# Patient Record
Sex: MALE | Race: BLACK OR AFRICAN AMERICAN | Employment: OTHER | URBAN - METROPOLITAN AREA
[De-identification: names, ages, dates, MRNs, and addresses within clinical notes are randomized per-mention and may not be internally consistent; named-entity substitution may affect disease eponyms.]

---

## 2017-01-07 DIAGNOSIS — I10 ESSENTIAL HYPERTENSION: ICD-10-CM

## 2017-01-10 RX ORDER — AMLODIPINE BESYLATE 5 MG/1
TABLET ORAL
Qty: 30 TABLET | Refills: 3 | Status: SHIPPED | OUTPATIENT
Start: 2017-01-10 | End: 2017-04-04 | Stop reason: SDUPTHER

## 2017-01-20 RX ORDER — GABAPENTIN 300 MG/1
CAPSULE ORAL
Qty: 270 CAPSULE | Refills: 0 | Status: SHIPPED | OUTPATIENT
Start: 2017-01-20 | End: 2017-10-10 | Stop reason: SDUPTHER

## 2017-02-02 RX ORDER — MELOXICAM 15 MG/1
TABLET ORAL
Qty: 30 TABLET | Refills: 0 | Status: SHIPPED | OUTPATIENT
Start: 2017-02-02 | End: 2017-04-04 | Stop reason: SDUPTHER

## 2017-03-13 RX ORDER — HYDROCHLOROTHIAZIDE 12.5 MG/1
12.5 CAPSULE, GELATIN COATED ORAL EVERY MORNING
Qty: 30 CAPSULE | Refills: 5 | Status: SHIPPED | OUTPATIENT
Start: 2017-03-13 | End: 2017-04-04

## 2017-03-13 RX ORDER — VALSARTAN AND HYDROCHLOROTHIAZIDE 320; 12.5 MG/1; MG/1
1 TABLET, FILM COATED ORAL DAILY
Qty: 30 TABLET | Refills: 5 | Status: SHIPPED | OUTPATIENT
Start: 2017-03-13 | End: 2017-05-08 | Stop reason: SDUPTHER

## 2017-03-15 ENCOUNTER — TELEPHONE (OUTPATIENT)
Dept: INTERNAL MEDICINE CLINIC | Age: 67
End: 2017-03-15

## 2017-03-21 ENCOUNTER — TELEPHONE (OUTPATIENT)
Dept: INTERNAL MEDICINE CLINIC | Age: 67
End: 2017-03-21

## 2017-04-03 RX ORDER — MELOXICAM 15 MG/1
TABLET ORAL
Qty: 30 TABLET | Refills: 0 | Status: SHIPPED | OUTPATIENT
Start: 2017-04-03 | End: 2017-04-04 | Stop reason: SDUPTHER

## 2017-04-04 ENCOUNTER — OFFICE VISIT (OUTPATIENT)
Dept: INTERNAL MEDICINE CLINIC | Age: 67
End: 2017-04-04

## 2017-04-04 VITALS
BODY MASS INDEX: 18.78 KG/M2 | WEIGHT: 110 LBS | HEART RATE: 86 BPM | HEIGHT: 64 IN | SYSTOLIC BLOOD PRESSURE: 110 MMHG | TEMPERATURE: 97.8 F | DIASTOLIC BLOOD PRESSURE: 60 MMHG | OXYGEN SATURATION: 96 %

## 2017-04-04 DIAGNOSIS — Z85.46 H/O PROSTATE CANCER: ICD-10-CM

## 2017-04-04 DIAGNOSIS — D53.9 ANEMIA, MACROCYTIC: Primary | ICD-10-CM

## 2017-04-04 DIAGNOSIS — E05.90 HYPERTHYROIDISM: ICD-10-CM

## 2017-04-04 DIAGNOSIS — Z12.11 ENCOUNTER FOR SCREENING COLONOSCOPY: ICD-10-CM

## 2017-04-04 DIAGNOSIS — I10 ESSENTIAL HYPERTENSION: ICD-10-CM

## 2017-04-04 DIAGNOSIS — E05.00 GRAVES DISEASE: ICD-10-CM

## 2017-04-04 PROCEDURE — 3288F FALL RISK ASSESSMENT DOCD: CPT | Performed by: INTERNAL MEDICINE

## 2017-04-04 PROCEDURE — G8510 SCR DEP NEG, NO PLAN REQD: HCPCS | Performed by: INTERNAL MEDICINE

## 2017-04-04 PROCEDURE — 99214 OFFICE O/P EST MOD 30 MIN: CPT | Performed by: INTERNAL MEDICINE

## 2017-04-04 RX ORDER — SILDENAFIL 100 MG/1
100 TABLET, FILM COATED ORAL PRN
Qty: 6 TABLET | Refills: 3 | Status: SHIPPED | OUTPATIENT
Start: 2017-04-04 | End: 2020-06-01 | Stop reason: SDUPTHER

## 2017-04-04 RX ORDER — MELOXICAM 15 MG/1
15 TABLET ORAL DAILY
Qty: 30 TABLET | Refills: 5 | Status: SHIPPED | OUTPATIENT
Start: 2017-04-04 | End: 2019-03-26

## 2017-04-04 ASSESSMENT — ENCOUNTER SYMPTOMS
GASTROINTESTINAL NEGATIVE: 1
RESPIRATORY NEGATIVE: 1
EYES NEGATIVE: 1

## 2017-04-04 ASSESSMENT — PATIENT HEALTH QUESTIONNAIRE - PHQ9
SUM OF ALL RESPONSES TO PHQ QUESTIONS 1-9: 0
SUM OF ALL RESPONSES TO PHQ9 QUESTIONS 1 & 2: 0
2. FEELING DOWN, DEPRESSED OR HOPELESS: 0
1. LITTLE INTEREST OR PLEASURE IN DOING THINGS: 0

## 2017-04-05 LAB
A/G RATIO: 1.9 (ref 1.1–2.2)
ALBUMIN SERPL-MCNC: 4.5 G/DL (ref 3.4–5)
ALP BLD-CCNC: 59 U/L (ref 40–129)
ALT SERPL-CCNC: 25 U/L (ref 10–40)
ANION GAP SERPL CALCULATED.3IONS-SCNC: 20 MMOL/L (ref 3–16)
AST SERPL-CCNC: 28 U/L (ref 15–37)
BILIRUB SERPL-MCNC: 1 MG/DL (ref 0–1)
BUN BLDV-MCNC: 44 MG/DL (ref 7–20)
CALCIUM SERPL-MCNC: 9.5 MG/DL (ref 8.3–10.6)
CHLORIDE BLD-SCNC: 96 MMOL/L (ref 99–110)
CHOLESTEROL, TOTAL: 222 MG/DL (ref 0–199)
CO2: 22 MMOL/L (ref 21–32)
CREAT SERPL-MCNC: 2.7 MG/DL (ref 0.8–1.3)
FOLATE: >20 NG/ML (ref 4.78–24.2)
GFR AFRICAN AMERICAN: 29
GFR NON-AFRICAN AMERICAN: 24
GLOBULIN: 2.4 G/DL
GLUCOSE BLD-MCNC: 90 MG/DL (ref 70–99)
HCT VFR BLD CALC: 38.3 % (ref 40.5–52.5)
HDLC SERPL-MCNC: 79 MG/DL (ref 40–60)
HEMATOLOGY PATH CONSULT: NORMAL
HEMATOLOGY PATH CONSULT: YES
HEMOGLOBIN: 12.5 G/DL (ref 13.5–17.5)
HEPATITIS C ANTIBODY INTERPRETATION: NORMAL
LDL CHOLESTEROL CALCULATED: 118 MG/DL
MCH RBC QN AUTO: 37.3 PG (ref 26–34)
MCHC RBC AUTO-ENTMCNC: 32.6 G/DL (ref 31–36)
MCV RBC AUTO: 114.4 FL (ref 80–100)
PDW BLD-RTO: 15.3 % (ref 12.4–15.4)
PLATELET # BLD: 197 K/UL (ref 135–450)
PMV BLD AUTO: 7.5 FL (ref 5–10.5)
POTASSIUM SERPL-SCNC: 4.2 MMOL/L (ref 3.5–5.1)
RBC # BLD: 3.35 M/UL (ref 4.2–5.9)
SLIDE REVIEW: ABNORMAL
SODIUM BLD-SCNC: 138 MMOL/L (ref 136–145)
TOTAL PROTEIN: 6.9 G/DL (ref 6.4–8.2)
TRIGL SERPL-MCNC: 123 MG/DL (ref 0–150)
TSH REFLEX FT4: 2.22 UIU/ML (ref 0.27–4.2)
VITAMIN B-12: >2000 PG/ML (ref 211–911)
VLDLC SERPL CALC-MCNC: 25 MG/DL
WBC # BLD: 5.5 K/UL (ref 4–11)

## 2017-05-08 ENCOUNTER — OFFICE VISIT (OUTPATIENT)
Dept: INTERNAL MEDICINE CLINIC | Age: 67
End: 2017-05-08

## 2017-05-08 VITALS
OXYGEN SATURATION: 95 % | SYSTOLIC BLOOD PRESSURE: 122 MMHG | HEIGHT: 64 IN | BODY MASS INDEX: 18.23 KG/M2 | WEIGHT: 106.8 LBS | DIASTOLIC BLOOD PRESSURE: 84 MMHG | HEART RATE: 84 BPM | TEMPERATURE: 97.8 F

## 2017-05-08 DIAGNOSIS — Z01.818 PRE-OP EXAMINATION: Primary | ICD-10-CM

## 2017-05-08 DIAGNOSIS — D53.9 ANEMIA, MACROCYTIC: ICD-10-CM

## 2017-05-08 DIAGNOSIS — E05.90 HYPERTHYROIDISM: ICD-10-CM

## 2017-05-08 DIAGNOSIS — I10 ESSENTIAL HYPERTENSION: ICD-10-CM

## 2017-05-08 LAB
ALBUMIN SERPL-MCNC: 4.9 G/DL (ref 3.4–5)
ANION GAP SERPL CALCULATED.3IONS-SCNC: 19 MMOL/L (ref 3–16)
BUN BLDV-MCNC: 28 MG/DL (ref 7–20)
CALCIUM SERPL-MCNC: 10.1 MG/DL (ref 8.3–10.6)
CHLORIDE BLD-SCNC: 103 MMOL/L (ref 99–110)
CO2: 23 MMOL/L (ref 21–32)
CREAT SERPL-MCNC: 1 MG/DL (ref 0.8–1.3)
GFR AFRICAN AMERICAN: >60
GFR NON-AFRICAN AMERICAN: >60
GLUCOSE BLD-MCNC: 115 MG/DL (ref 70–99)
PHOSPHORUS: 2.4 MG/DL (ref 2.5–4.9)
POTASSIUM SERPL-SCNC: 4.5 MMOL/L (ref 3.5–5.1)
SODIUM BLD-SCNC: 145 MMOL/L (ref 136–145)

## 2017-05-08 PROCEDURE — 3288F FALL RISK ASSESSMENT DOCD: CPT | Performed by: INTERNAL MEDICINE

## 2017-05-08 PROCEDURE — G0009 ADMIN PNEUMOCOCCAL VACCINE: HCPCS | Performed by: INTERNAL MEDICINE

## 2017-05-08 PROCEDURE — 93000 ELECTROCARDIOGRAM COMPLETE: CPT | Performed by: INTERNAL MEDICINE

## 2017-05-08 PROCEDURE — 99215 OFFICE O/P EST HI 40 MIN: CPT | Performed by: INTERNAL MEDICINE

## 2017-05-08 PROCEDURE — G8510 SCR DEP NEG, NO PLAN REQD: HCPCS | Performed by: INTERNAL MEDICINE

## 2017-05-08 PROCEDURE — 90670 PCV13 VACCINE IM: CPT | Performed by: INTERNAL MEDICINE

## 2017-05-08 RX ORDER — VALSARTAN AND HYDROCHLOROTHIAZIDE 320; 12.5 MG/1; MG/1
1 TABLET, FILM COATED ORAL DAILY
Qty: 30 TABLET | Refills: 5 | Status: SHIPPED | OUTPATIENT
Start: 2017-05-08 | End: 2017-07-05 | Stop reason: SDUPTHER

## 2017-05-08 ASSESSMENT — PATIENT HEALTH QUESTIONNAIRE - PHQ9
SUM OF ALL RESPONSES TO PHQ9 QUESTIONS 1 & 2: 0
2. FEELING DOWN, DEPRESSED OR HOPELESS: 0
SUM OF ALL RESPONSES TO PHQ QUESTIONS 1-9: 0
1. LITTLE INTEREST OR PLEASURE IN DOING THINGS: 0

## 2017-05-08 ASSESSMENT — ENCOUNTER SYMPTOMS
RESPIRATORY NEGATIVE: 1
GASTROINTESTINAL NEGATIVE: 1
EYES NEGATIVE: 1

## 2017-06-07 RX ORDER — MELOXICAM 15 MG/1
TABLET ORAL
Qty: 30 TABLET | Refills: 0 | Status: SHIPPED | OUTPATIENT
Start: 2017-06-07 | End: 2018-03-22 | Stop reason: SDUPTHER

## 2017-07-05 ENCOUNTER — OFFICE VISIT (OUTPATIENT)
Dept: INTERNAL MEDICINE CLINIC | Age: 67
End: 2017-07-05

## 2017-07-05 VITALS
SYSTOLIC BLOOD PRESSURE: 116 MMHG | BODY MASS INDEX: 17.55 KG/M2 | HEIGHT: 64 IN | TEMPERATURE: 98.6 F | WEIGHT: 102.8 LBS | OXYGEN SATURATION: 92 % | DIASTOLIC BLOOD PRESSURE: 82 MMHG | HEART RATE: 78 BPM

## 2017-07-05 DIAGNOSIS — F10.10 ETOH ABUSE: Primary | Chronic | ICD-10-CM

## 2017-07-05 DIAGNOSIS — D50.9 IRON DEFICIENCY ANEMIA, UNSPECIFIED IRON DEFICIENCY ANEMIA TYPE: ICD-10-CM

## 2017-07-05 DIAGNOSIS — G95.20 CERVICAL CORD COMPRESSION WITH MYELOPATHY (HCC): ICD-10-CM

## 2017-07-05 DIAGNOSIS — Z11.3 SCREEN FOR STD (SEXUALLY TRANSMITTED DISEASE): ICD-10-CM

## 2017-07-05 DIAGNOSIS — R63.4 WEIGHT LOSS: ICD-10-CM

## 2017-07-05 DIAGNOSIS — E05.90 HYPERTHYROIDISM: ICD-10-CM

## 2017-07-05 DIAGNOSIS — I10 ESSENTIAL HYPERTENSION: ICD-10-CM

## 2017-07-05 LAB
A/G RATIO: 1.8 (ref 1.1–2.2)
ALBUMIN SERPL-MCNC: 4.6 G/DL (ref 3.4–5)
ALP BLD-CCNC: 68 U/L (ref 40–129)
ALT SERPL-CCNC: 9 U/L (ref 10–40)
ANION GAP SERPL CALCULATED.3IONS-SCNC: 19 MMOL/L (ref 3–16)
AST SERPL-CCNC: 14 U/L (ref 15–37)
BILIRUB SERPL-MCNC: 0.7 MG/DL (ref 0–1)
BUN BLDV-MCNC: 49 MG/DL (ref 7–20)
CALCIUM SERPL-MCNC: 10.2 MG/DL (ref 8.3–10.6)
CHLORIDE BLD-SCNC: 99 MMOL/L (ref 99–110)
CHOLESTEROL, TOTAL: 216 MG/DL (ref 0–199)
CO2: 23 MMOL/L (ref 21–32)
CREAT SERPL-MCNC: 1.4 MG/DL (ref 0.8–1.3)
GFR AFRICAN AMERICAN: >60
GFR NON-AFRICAN AMERICAN: 51
GLOBULIN: 2.6 G/DL
GLUCOSE BLD-MCNC: 100 MG/DL (ref 70–99)
HDLC SERPL-MCNC: 61 MG/DL (ref 40–60)
HEPATITIS C ANTIBODY INTERPRETATION: NORMAL
LDL CHOLESTEROL CALCULATED: ABNORMAL MG/DL
LDL CHOLESTEROL DIRECT: 63 MG/DL
POTASSIUM SERPL-SCNC: 5.3 MMOL/L (ref 3.5–5.1)
SODIUM BLD-SCNC: 141 MMOL/L (ref 136–145)
TOTAL PROTEIN: 7.2 G/DL (ref 6.4–8.2)
TRIGL SERPL-MCNC: 879 MG/DL (ref 0–150)
VLDLC SERPL CALC-MCNC: ABNORMAL MG/DL

## 2017-07-05 PROCEDURE — 99214 OFFICE O/P EST MOD 30 MIN: CPT | Performed by: INTERNAL MEDICINE

## 2017-07-05 RX ORDER — PROMETHAZINE HYDROCHLORIDE 12.5 MG/1
12.5 SUPPOSITORY RECTAL EVERY 6 HOURS PRN
COMMUNITY
End: 2019-03-26

## 2017-07-05 RX ORDER — NAPROXEN 500 MG/1
500 TABLET ORAL 2 TIMES DAILY WITH MEALS
COMMUNITY
End: 2018-07-26 | Stop reason: SDUPTHER

## 2017-07-05 RX ORDER — FOLIC ACID 1 MG/1
1 TABLET ORAL DAILY
COMMUNITY
End: 2018-01-09 | Stop reason: SDUPTHER

## 2017-07-05 RX ORDER — SENNA AND DOCUSATE SODIUM 50; 8.6 MG/1; MG/1
1 TABLET, FILM COATED ORAL 2 TIMES DAILY PRN
COMMUNITY
End: 2019-03-26

## 2017-07-05 RX ORDER — CHOLECALCIFEROL (VITAMIN D3) 1250 MCG
50000 CAPSULE ORAL WEEKLY
COMMUNITY
End: 2019-03-26

## 2017-07-05 RX ORDER — ASPIRIN 325 MG
325 TABLET ORAL DAILY
COMMUNITY
End: 2018-07-18

## 2017-07-05 RX ORDER — ASCORBIC ACID 500 MG
500 TABLET ORAL DAILY
COMMUNITY

## 2017-07-05 RX ORDER — VALSARTAN AND HYDROCHLOROTHIAZIDE 320; 12.5 MG/1; MG/1
1 TABLET, FILM COATED ORAL DAILY
Qty: 30 TABLET | Refills: 5 | Status: SHIPPED | OUTPATIENT
Start: 2017-07-05 | End: 2017-09-19 | Stop reason: SDUPTHER

## 2017-07-05 ASSESSMENT — ENCOUNTER SYMPTOMS
EYES NEGATIVE: 1
RESPIRATORY NEGATIVE: 1
GASTROINTESTINAL NEGATIVE: 1

## 2017-09-19 RX ORDER — VALSARTAN AND HYDROCHLOROTHIAZIDE 320; 12.5 MG/1; MG/1
1 TABLET, FILM COATED ORAL DAILY
Qty: 90 TABLET | Refills: 1 | Status: SHIPPED | OUTPATIENT
Start: 2017-09-19 | End: 2018-04-18 | Stop reason: SDUPTHER

## 2017-10-05 ENCOUNTER — OFFICE VISIT (OUTPATIENT)
Dept: INTERNAL MEDICINE CLINIC | Age: 67
End: 2017-10-05

## 2017-10-05 VITALS
WEIGHT: 110 LBS | HEART RATE: 88 BPM | OXYGEN SATURATION: 98 % | TEMPERATURE: 97.4 F | HEIGHT: 64 IN | BODY MASS INDEX: 18.78 KG/M2 | SYSTOLIC BLOOD PRESSURE: 128 MMHG | DIASTOLIC BLOOD PRESSURE: 100 MMHG

## 2017-10-05 DIAGNOSIS — F10.10 ETOH ABUSE: Primary | Chronic | ICD-10-CM

## 2017-10-05 DIAGNOSIS — I10 ESSENTIAL HYPERTENSION: ICD-10-CM

## 2017-10-05 DIAGNOSIS — Z23 NEED FOR VACCINATION: ICD-10-CM

## 2017-10-05 DIAGNOSIS — Z23 FLU VACCINE NEED: ICD-10-CM

## 2017-10-05 DIAGNOSIS — E05.90 HYPERTHYROIDISM: ICD-10-CM

## 2017-10-05 DIAGNOSIS — G56.03 BILATERAL CARPAL TUNNEL SYNDROME: ICD-10-CM

## 2017-10-05 DIAGNOSIS — D53.9 ANEMIA, MACROCYTIC: ICD-10-CM

## 2017-10-05 PROCEDURE — 3017F COLORECTAL CA SCREEN DOC REV: CPT | Performed by: INTERNAL MEDICINE

## 2017-10-05 PROCEDURE — G8427 DOCREV CUR MEDS BY ELIG CLIN: HCPCS | Performed by: INTERNAL MEDICINE

## 2017-10-05 PROCEDURE — 90662 IIV NO PRSV INCREASED AG IM: CPT | Performed by: INTERNAL MEDICINE

## 2017-10-05 PROCEDURE — G8484 FLU IMMUNIZE NO ADMIN: HCPCS | Performed by: INTERNAL MEDICINE

## 2017-10-05 PROCEDURE — 1036F TOBACCO NON-USER: CPT | Performed by: INTERNAL MEDICINE

## 2017-10-05 PROCEDURE — 99214 OFFICE O/P EST MOD 30 MIN: CPT | Performed by: INTERNAL MEDICINE

## 2017-10-05 PROCEDURE — G8420 CALC BMI NORM PARAMETERS: HCPCS | Performed by: INTERNAL MEDICINE

## 2017-10-05 PROCEDURE — 4040F PNEUMOC VAC/ADMIN/RCVD: CPT | Performed by: INTERNAL MEDICINE

## 2017-10-05 PROCEDURE — G0008 ADMIN INFLUENZA VIRUS VAC: HCPCS | Performed by: INTERNAL MEDICINE

## 2017-10-05 PROCEDURE — 1123F ACP DISCUSS/DSCN MKR DOCD: CPT | Performed by: INTERNAL MEDICINE

## 2017-10-05 RX ORDER — AMLODIPINE BESYLATE 5 MG/1
5 TABLET ORAL DAILY
Qty: 30 TABLET | Refills: 5 | Status: SHIPPED | OUTPATIENT
Start: 2017-10-05 | End: 2022-03-02

## 2017-10-05 ASSESSMENT — ENCOUNTER SYMPTOMS
EYES NEGATIVE: 1
RESPIRATORY NEGATIVE: 1
GASTROINTESTINAL NEGATIVE: 1

## 2017-10-05 NOTE — MR AVS SNAPSHOT
ETOH abuse (Chronic)    Cataract (Chronic)    Weight loss    Cervical cord compression with myelopathy ([facility] [address]      Immunizations as of 10/5/2017     Name Date    Influenza Virus Vaccine 1/13/2016    Pneumococcal 13-valent Conjugate ([id number]) 5/8/2017    Pneumococcal Polysaccharide (Hgennonse22) 12/19/2016      Preventive Care        Date Due    Tetanus Combination Vaccine (1 - Tdap) 5/12/1970    Colonoscopy 5/12/2001    Zoster Vaccine 5/12/2011    Yearly Flu Vaccine (1) 9/1/2017    Cholesterol Screening 7/5/2022            MyChart Signup           Our records indicate that you have declined MyChart signup.

## 2017-10-05 NOTE — PROGRESS NOTES
Subjective:      Patient ID: Amanda Napoles is a 78 y.o. male. Hypertension   This is a chronic problem. The problem is unchanged. The problem is controlled. Pertinent negatives include no chest pain. There are no associated agents to hypertension. Risk factors for coronary artery disease include dyslipidemia, family history and male gender. Past treatments include angiotensin blockers, calcium channel blockers, diuretics and lifestyle changes. Hand Pain    The incident occurred at home. There was no injury mechanism. The pain is present in the left hand and right hand. The quality of the pain is described as burning. The pain is at a severity of 3/10. The pain is mild. The pain has been worsening since the incident. Pertinent negatives include no chest pain. Hyperlipidemia   Pertinent negatives include no chest pain. Review of Systems   Constitutional: Negative. HENT: Negative. Eyes: Negative. Respiratory: Negative. Cardiovascular: Negative. Negative for chest pain. Gastrointestinal: Negative. Genitourinary: Negative. Musculoskeletal: Negative. Skin: Negative. Neurological: Negative. Psychiatric/Behavioral: Negative. Objective:   Physical Exam   Constitutional: He is oriented to person, place, and time. He appears well-developed and well-nourished. HENT:   Head: Normocephalic and atraumatic. Left Ear: External ear normal.   Eyes: Conjunctivae and EOM are normal. Pupils are equal, round, and reactive to light. Neck: Normal range of motion. Neck supple. No thyromegaly present. Cardiovascular: Normal rate, regular rhythm and normal heart sounds. No murmur heard. Pulmonary/Chest: Effort normal and breath sounds normal. No respiratory distress. He has no wheezes. He has no rales. Abdominal: Soft. Bowel sounds are normal.   Musculoskeletal: Normal range of motion. Neurological: He is alert and oriented to person, place, and time.    Skin: Skin is warm and dry.   Psychiatric: He has a normal mood and affect.        Assessment:     hypertension  Stable    Hyperlipidemia  On diet    Hand pain from CTS      Plan:     continue meds    Refills    Labs    Flu vac    Return in 3 months

## 2017-10-10 RX ORDER — GABAPENTIN 300 MG/1
CAPSULE ORAL
Qty: 270 CAPSULE | Refills: 0 | Status: SHIPPED | OUTPATIENT
Start: 2017-10-10 | End: 2017-11-06 | Stop reason: SDUPTHER

## 2017-11-08 RX ORDER — GABAPENTIN 300 MG/1
CAPSULE ORAL
Qty: 270 CAPSULE | Refills: 0 | Status: SHIPPED | OUTPATIENT
Start: 2017-11-08 | End: 2019-03-26

## 2017-12-12 ENCOUNTER — OFFICE VISIT (OUTPATIENT)
Dept: INTERNAL MEDICINE CLINIC | Age: 67
End: 2017-12-12

## 2017-12-12 VITALS
DIASTOLIC BLOOD PRESSURE: 90 MMHG | HEIGHT: 64 IN | HEART RATE: 94 BPM | WEIGHT: 112 LBS | OXYGEN SATURATION: 98 % | SYSTOLIC BLOOD PRESSURE: 130 MMHG | BODY MASS INDEX: 19.12 KG/M2

## 2017-12-12 DIAGNOSIS — D53.9 ANEMIA, MACROCYTIC: Primary | ICD-10-CM

## 2017-12-12 DIAGNOSIS — F10.10 ETOH ABUSE: Chronic | ICD-10-CM

## 2017-12-12 DIAGNOSIS — S22.42XA CLOSED FRACTURE OF MULTIPLE RIBS OF LEFT SIDE, INITIAL ENCOUNTER: ICD-10-CM

## 2017-12-12 DIAGNOSIS — E05.90 HYPERTHYROIDISM: ICD-10-CM

## 2017-12-12 DIAGNOSIS — I10 ESSENTIAL HYPERTENSION: ICD-10-CM

## 2017-12-12 LAB
A/G RATIO: 1.3 (ref 1.1–2.2)
ALBUMIN SERPL-MCNC: 4.3 G/DL (ref 3.4–5)
ALP BLD-CCNC: 109 U/L (ref 40–129)
ALT SERPL-CCNC: 7 U/L (ref 10–40)
ANION GAP SERPL CALCULATED.3IONS-SCNC: 17 MMOL/L (ref 3–16)
AST SERPL-CCNC: 18 U/L (ref 15–37)
BILIRUB SERPL-MCNC: 0.3 MG/DL (ref 0–1)
BUN BLDV-MCNC: 23 MG/DL (ref 7–20)
CALCIUM SERPL-MCNC: 10.2 MG/DL (ref 8.3–10.6)
CHLORIDE BLD-SCNC: 99 MMOL/L (ref 99–110)
CO2: 25 MMOL/L (ref 21–32)
CREAT SERPL-MCNC: 0.9 MG/DL (ref 0.8–1.3)
GFR AFRICAN AMERICAN: >60
GFR NON-AFRICAN AMERICAN: >60
GLOBULIN: 3.4 G/DL
GLUCOSE BLD-MCNC: 84 MG/DL (ref 70–99)
HCT VFR BLD CALC: 37.3 % (ref 40.5–52.5)
HEMOGLOBIN: 12.4 G/DL (ref 13.5–17.5)
MCH RBC QN AUTO: 36.4 PG (ref 26–34)
MCHC RBC AUTO-ENTMCNC: 33.1 G/DL (ref 31–36)
MCV RBC AUTO: 109.9 FL (ref 80–100)
PDW BLD-RTO: 15.5 % (ref 12.4–15.4)
PLATELET # BLD: 344 K/UL (ref 135–450)
PMV BLD AUTO: 7.4 FL (ref 5–10.5)
POTASSIUM SERPL-SCNC: 5.7 MMOL/L (ref 3.5–5.1)
RBC # BLD: 3.39 M/UL (ref 4.2–5.9)
SODIUM BLD-SCNC: 141 MMOL/L (ref 136–145)
TOTAL PROTEIN: 7.7 G/DL (ref 6.4–8.2)
WBC # BLD: 4.5 K/UL (ref 4–11)

## 2017-12-12 PROCEDURE — 99213 OFFICE O/P EST LOW 20 MIN: CPT | Performed by: INTERNAL MEDICINE

## 2017-12-12 PROCEDURE — 1111F DSCHRG MED/CURRENT MED MERGE: CPT | Performed by: INTERNAL MEDICINE

## 2017-12-12 RX ORDER — HYDROCODONE BITARTRATE AND ACETAMINOPHEN 5; 325 MG/1; MG/1
1 TABLET ORAL EVERY 6 HOURS PRN
Qty: 20 TABLET | Refills: 0 | Status: SHIPPED | OUTPATIENT
Start: 2017-12-12 | End: 2017-12-19

## 2017-12-12 ASSESSMENT — ENCOUNTER SYMPTOMS
GASTROINTESTINAL NEGATIVE: 1
SHORTNESS OF BREATH: 1
EYES NEGATIVE: 1

## 2017-12-12 NOTE — PROGRESS NOTES
Post-Discharge Transitional Care Management Services      Donald Mckenzie   YOB: 1950    Date of Office Visit:  12/12/2017  Date of Hospital Admission: 5/15/12  Date of Hospital Discharge: 5/19/12  [facility] Risk Score [risk of hospital readmission >=10  medium risk (chance of readmission ~ 12%) >14  high risk (chance of readmission ~18%)]: No Data Recorded    Care management risk score Rising risk (score 2-5) and Complex Care (Scores >=6): 5       Patient Active Problem List   Diagnosis    Cervical cord compression with myelopathy (HCC)    Anemia, macrocytic    ETOH abuse    Cataract    Weight loss    H/O prostate cancer    Hyperthyroidism    Graves disease    MGUS (monoclonal gammopathy of unknown significance)    Abnormal ultrasound of kidney    Essential hypertension    Bilateral carpal tunnel syndrome       No Known Allergies    Medications listed as ordered at the time of discharge from [facility]   [facility]   Home Medication Instructions ALINA:    Printed on:12/12/17 6982   Medication Information                      amLODIPine (NORVASC) 5 MG tablet  Take 1 tablet by mouth daily             Ascorbic Acid (VITAMIN C) 500 MG tablet  Take 500 mg by mouth daily             aspirin 325 MG tablet  Take 325 mg by mouth daily             Cholecalciferol (VITAMIN D3) 41947 units CAPS  Take 50,000 capsules by mouth once a week             folic acid (FOLVITE) 1 MG tablet  Take 1 mg by mouth daily             gabapentin (NEURONTIN) 300 MG capsule  TAKE 3 CAPSULES BY MOUTH THREE TIMES DAILY             meloxicam (MOBIC) 15 MG tablet  Take 1 tablet by mouth daily             meloxicam (MOBIC) 15 MG tablet  TAKE 1 TABLET BY MOUTH DAILY             naproxen (NAPROSYN) 500 MG tablet  Take 500 mg by mouth 2 times daily (with meals)             promethazine (PHENERGAN) 12.5 MG suppository  Place 12.5 mg rectally every 6 hours as needed for Nausea             sennosides-docusate sodium (SENOKOT-S) 8.6-50 MG tablet  Take 1 tablet by mouth 2 times daily as needed for Constipation             sildenafil (VIAGRA) 100 MG tablet  Take 1 tablet by mouth as needed for Erectile Dysfunction             valsartan-hydrochlorothiazide (DIOVAN-HCT) 320-12.5 MG per tablet  Take 1 tablet by mouth daily                   Medications marked \"taking\" at this time  Outpatient Prescriptions Marked as Taking for the 12/12/17 encounter (Office Visit) with Barry Lomax MD   Medication Sig Dispense Refill    gabapentin (NEURONTIN) 300 MG capsule TAKE 3 CAPSULES BY MOUTH THREE TIMES DAILY 270 capsule 0    amLODIPine (NORVASC) 5 MG tablet Take 1 tablet by mouth daily 30 tablet 5    valsartan-hydrochlorothiazide (DIOVAN-HCT) 320-12.5 MG per tablet Take 1 tablet by mouth daily 90 tablet 1    sennosides-docusate sodium (SENOKOT-S) 8.6-50 MG tablet Take 1 tablet by mouth 2 times daily as needed for Constipation      Cholecalciferol (VITAMIN D3) 10358 units CAPS Take 50,000 capsules by mouth once a week      Ascorbic Acid (VITAMIN C) 500 MG tablet Take 500 mg by mouth daily      aspirin 325 MG tablet Take 325 mg by mouth daily      promethazine (PHENERGAN) 12.5 MG suppository Place 12.5 mg rectally every 6 hours as needed for Nausea      naproxen (NAPROSYN) 500 MG tablet Take 500 mg by mouth 2 times daily (with meals)      folic acid (FOLVITE) 1 MG tablet Take 1 mg by mouth daily      meloxicam (MOBIC) 15 MG tablet TAKE 1 TABLET BY MOUTH DAILY 30 tablet 0    meloxicam (MOBIC) 15 MG tablet Take 1 tablet by mouth daily 30 tablet 5    sildenafil (VIAGRA) 100 MG tablet Take 1 tablet by mouth as needed for Erectile Dysfunction 6 tablet 3        Medications patient taking as of now reconciled against medications ordered at time of hospital discharge unknown    Vitals:    12/12/17 1436 12/12/17 1439   BP: (!) 120/90 (!) 130/90   Site: Right Arm Right Arm   Position: Sitting Sitting   Cuff Size: Medium Adult Medium Adult   Pulse: 94 SpO2: 98%    Weight: 112 lb (50.8 kg)    Height: 5' 4\" (1.626 m)      Body mass index is 19.22 kg/m². Wt Readings from Last 3 Encounters:   12/12/17 112 lb (50.8 kg)   10/05/17 110 lb (49.9 kg)   07/05/17 102 lb 12.8 oz (46.6 kg)     BP Readings from Last 3 Encounters:   12/12/17 (!) 130/90   10/05/17 (!) 128/100   07/05/17 116/82        Inpatient course: Discharge summary reviewed- see chart. Chief Complaint   Patient presents with    Follow-Up from Hospital       Arm Pain    The incident occurred 5 to 7 days ago. The incident occurred in the yard. The injury mechanism was a direct blow. The pain is present in the left elbow. The quality of the pain is described as stabbing. The pain does not radiate. The pain is at a severity of 5/10. The pain is moderate. The pain has been constant since the incident. Associated symptoms include chest pain. He has tried NSAIDs for the symptoms. The treatment provided mild relief. Chest Pain    This is a new problem. The current episode started in the past 7 days. The onset quality is sudden. The problem occurs daily. The problem has been unchanged. The pain is present in the lateral region. The pain is at a severity of 5/10. The pain is moderate. The quality of the pain is described as stabbing and sharp. The pain does not radiate. Associated symptoms include shortness of breath. The pain is aggravated by movement and breathing. He has tried analgesics for the symptoms. The treatment provided moderate relief. There are no known risk factors. Review of Systems   Constitutional: Negative. HENT: Negative. Eyes: Negative. Respiratory: Positive for shortness of breath. Cardiovascular: Positive for chest pain. Gastrointestinal: Negative. Genitourinary: Negative. Musculoskeletal: Negative. Skin: Negative. Neurological: Negative. Psychiatric/Behavioral: Negative.         Non face to face  following discharge, date last encounter closed (first attempt may have been earlier): *No documented post hospital discharge outreach found in the last 14 days *No documented post hospital discharge outreach found in the last 14 days    Call initiated 2 business days of discharge: *No response recorded in the last 14 days     Interval history/Current status:none      Physical Exam   Constitutional: He is oriented to person, place, and time. He appears well-developed and well-nourished. HENT:   Head: Normocephalic and atraumatic. Left Ear: External ear normal.   Eyes: Conjunctivae and EOM are normal. Pupils are equal, round, and reactive to light. Neck: Normal range of motion. Neck supple. No thyromegaly present. Cardiovascular: Normal rate, regular rhythm and normal heart sounds. No murmur heard. Pulmonary/Chest: Effort normal and breath sounds normal. No respiratory distress. He has no wheezes. He has no rales. He exhibits tenderness. Left ribcage tenderness from fractured ribs 9-11. Abdominal: Soft. Bowel sounds are normal.   Musculoskeletal: Normal range of motion. Neurological: He is alert and oriented to person, place, and time. Skin: Skin is warm and dry. Psychiatric: He has a normal mood and affect. Assessment/Plan:  Virgel Rinne was seen today for follow-up from hospital.    Diagnoses and all orders for this visit:    Anemia, macrocytic  -     CBC    Hyperthyroidism    ETOH abuse    Essential hypertension  -     Comprehensive Metabolic Panel      Fractured ribs   #3  Left side    Left elbow pain and injury.      Medical Decision Making: moderate complexity    Given toradol 60mg IM   follow up with ortho   norco 5mg #20    Follow up with orthopedist    Return in 4 weeks

## 2018-01-09 ENCOUNTER — OFFICE VISIT (OUTPATIENT)
Dept: INTERNAL MEDICINE CLINIC | Age: 68
End: 2018-01-09

## 2018-01-09 VITALS
HEIGHT: 64 IN | SYSTOLIC BLOOD PRESSURE: 130 MMHG | DIASTOLIC BLOOD PRESSURE: 74 MMHG | HEART RATE: 64 BPM | WEIGHT: 116 LBS | BODY MASS INDEX: 19.81 KG/M2 | OXYGEN SATURATION: 97 %

## 2018-01-09 DIAGNOSIS — D53.9 ANEMIA, MACROCYTIC: ICD-10-CM

## 2018-01-09 DIAGNOSIS — E05.90 HYPERTHYROIDISM: ICD-10-CM

## 2018-01-09 DIAGNOSIS — Z85.46 H/O PROSTATE CANCER: ICD-10-CM

## 2018-01-09 DIAGNOSIS — F10.10 ETOH ABUSE: Primary | Chronic | ICD-10-CM

## 2018-01-09 DIAGNOSIS — E78.1 PURE HYPERGLYCERIDEMIA: ICD-10-CM

## 2018-01-09 LAB
CHOLESTEROL, TOTAL: 222 MG/DL (ref 0–199)
FOLATE: >20 NG/ML (ref 4.78–24.2)
HDLC SERPL-MCNC: 45 MG/DL (ref 40–60)
LDL CHOLESTEROL CALCULATED: ABNORMAL MG/DL
LDL CHOLESTEROL DIRECT: 101 MG/DL
TRIGL SERPL-MCNC: 380 MG/DL (ref 0–150)
TSH REFLEX FT4: 3.63 UIU/ML (ref 0.27–4.2)
VITAMIN B-12: 1740 PG/ML (ref 211–911)
VLDLC SERPL CALC-MCNC: ABNORMAL MG/DL

## 2018-01-09 PROCEDURE — 1123F ACP DISCUSS/DSCN MKR DOCD: CPT | Performed by: INTERNAL MEDICINE

## 2018-01-09 PROCEDURE — 3017F COLORECTAL CA SCREEN DOC REV: CPT | Performed by: INTERNAL MEDICINE

## 2018-01-09 PROCEDURE — G8484 FLU IMMUNIZE NO ADMIN: HCPCS | Performed by: INTERNAL MEDICINE

## 2018-01-09 PROCEDURE — 99214 OFFICE O/P EST MOD 30 MIN: CPT | Performed by: INTERNAL MEDICINE

## 2018-01-09 PROCEDURE — 1036F TOBACCO NON-USER: CPT | Performed by: INTERNAL MEDICINE

## 2018-01-09 PROCEDURE — G8427 DOCREV CUR MEDS BY ELIG CLIN: HCPCS | Performed by: INTERNAL MEDICINE

## 2018-01-09 PROCEDURE — G8420 CALC BMI NORM PARAMETERS: HCPCS | Performed by: INTERNAL MEDICINE

## 2018-01-09 PROCEDURE — 4040F PNEUMOC VAC/ADMIN/RCVD: CPT | Performed by: INTERNAL MEDICINE

## 2018-01-09 RX ORDER — FOLIC ACID 1 MG/1
1 TABLET ORAL DAILY
Qty: 30 TABLET | Refills: 5 | Status: SHIPPED | OUTPATIENT
Start: 2018-01-09 | End: 2018-10-08 | Stop reason: SDUPTHER

## 2018-01-09 ASSESSMENT — ENCOUNTER SYMPTOMS
GASTROINTESTINAL NEGATIVE: 1
EYES NEGATIVE: 1
RESPIRATORY NEGATIVE: 1

## 2018-03-26 RX ORDER — MELOXICAM 15 MG/1
TABLET ORAL
Qty: 90 TABLET | Refills: 0 | Status: SHIPPED | OUTPATIENT
Start: 2018-03-26 | End: 2018-06-23 | Stop reason: SDUPTHER

## 2018-04-11 ENCOUNTER — OFFICE VISIT (OUTPATIENT)
Dept: INTERNAL MEDICINE CLINIC | Age: 68
End: 2018-04-11

## 2018-04-11 VITALS
BODY MASS INDEX: 20.14 KG/M2 | WEIGHT: 118 LBS | TEMPERATURE: 97.9 F | OXYGEN SATURATION: 98 % | SYSTOLIC BLOOD PRESSURE: 104 MMHG | DIASTOLIC BLOOD PRESSURE: 84 MMHG | HEIGHT: 64 IN | HEART RATE: 85 BPM

## 2018-04-11 DIAGNOSIS — Z85.46 H/O PROSTATE CANCER: ICD-10-CM

## 2018-04-11 DIAGNOSIS — E78.2 MIXED HYPERLIPIDEMIA: ICD-10-CM

## 2018-04-11 DIAGNOSIS — E05.90 HYPERTHYROIDISM: ICD-10-CM

## 2018-04-11 DIAGNOSIS — R07.89 OTHER CHEST PAIN: ICD-10-CM

## 2018-04-11 DIAGNOSIS — D53.9 ANEMIA, MACROCYTIC: Primary | ICD-10-CM

## 2018-04-11 DIAGNOSIS — M25.522 LEFT ELBOW PAIN: ICD-10-CM

## 2018-04-11 LAB
ALBUMIN SERPL-MCNC: 4.6 G/DL (ref 3.4–5)
ALP BLD-CCNC: 77 U/L (ref 40–129)
ALT SERPL-CCNC: 20 U/L (ref 10–40)
AST SERPL-CCNC: 19 U/L (ref 15–37)
BILIRUB SERPL-MCNC: 0.5 MG/DL (ref 0–1)
BILIRUBIN DIRECT: <0.2 MG/DL (ref 0–0.3)
BILIRUBIN, INDIRECT: NORMAL MG/DL (ref 0–1)
CHOLESTEROL, TOTAL: 224 MG/DL (ref 0–199)
HCT VFR BLD CALC: 36.8 % (ref 40.5–52.5)
HDLC SERPL-MCNC: 37 MG/DL (ref 40–60)
HEMOGLOBIN: 12.4 G/DL (ref 13.5–17.5)
LDL CHOLESTEROL CALCULATED: ABNORMAL MG/DL
LDL CHOLESTEROL DIRECT: 50 MG/DL
MCH RBC QN AUTO: 34.6 PG (ref 26–34)
MCHC RBC AUTO-ENTMCNC: 33.6 G/DL (ref 31–36)
MCV RBC AUTO: 102.8 FL (ref 80–100)
PDW BLD-RTO: 14.8 % (ref 12.4–15.4)
PLATELET # BLD: 246 K/UL (ref 135–450)
PMV BLD AUTO: 7.9 FL (ref 5–10.5)
RBC # BLD: 3.58 M/UL (ref 4.2–5.9)
T4 TOTAL: 5 UG/DL (ref 4.5–10.9)
TOTAL PROTEIN: 7.3 G/DL (ref 6.4–8.2)
TRIGL SERPL-MCNC: 1016 MG/DL (ref 0–150)
VLDLC SERPL CALC-MCNC: ABNORMAL MG/DL
WBC # BLD: 5.2 K/UL (ref 4–11)

## 2018-04-11 PROCEDURE — 99214 OFFICE O/P EST MOD 30 MIN: CPT | Performed by: INTERNAL MEDICINE

## 2018-04-11 PROCEDURE — 1123F ACP DISCUSS/DSCN MKR DOCD: CPT | Performed by: INTERNAL MEDICINE

## 2018-04-11 PROCEDURE — 3017F COLORECTAL CA SCREEN DOC REV: CPT | Performed by: INTERNAL MEDICINE

## 2018-04-11 PROCEDURE — 1036F TOBACCO NON-USER: CPT | Performed by: INTERNAL MEDICINE

## 2018-04-11 PROCEDURE — G8427 DOCREV CUR MEDS BY ELIG CLIN: HCPCS | Performed by: INTERNAL MEDICINE

## 2018-04-11 PROCEDURE — G8420 CALC BMI NORM PARAMETERS: HCPCS | Performed by: INTERNAL MEDICINE

## 2018-04-11 PROCEDURE — 4040F PNEUMOC VAC/ADMIN/RCVD: CPT | Performed by: INTERNAL MEDICINE

## 2018-04-11 ASSESSMENT — PATIENT HEALTH QUESTIONNAIRE - PHQ9
1. LITTLE INTEREST OR PLEASURE IN DOING THINGS: 0
2. FEELING DOWN, DEPRESSED OR HOPELESS: 0
SUM OF ALL RESPONSES TO PHQ9 QUESTIONS 1 & 2: 0
SUM OF ALL RESPONSES TO PHQ QUESTIONS 1-9: 0

## 2018-04-11 ASSESSMENT — ENCOUNTER SYMPTOMS
EYES NEGATIVE: 1
GASTROINTESTINAL NEGATIVE: 1
RESPIRATORY NEGATIVE: 1

## 2018-04-12 DIAGNOSIS — E78.1 HYPERTRIGLYCERIDEMIA: Primary | ICD-10-CM

## 2018-04-12 RX ORDER — FENOFIBRATE 145 MG/1
145 TABLET, COATED ORAL DAILY
Qty: 30 TABLET | Refills: 3 | Status: SHIPPED | OUTPATIENT
Start: 2018-04-12 | End: 2018-10-08 | Stop reason: SDUPTHER

## 2018-04-23 RX ORDER — VALSARTAN AND HYDROCHLOROTHIAZIDE 320; 12.5 MG/1; MG/1
TABLET, FILM COATED ORAL
Qty: 90 TABLET | Refills: 5 | Status: SHIPPED | OUTPATIENT
Start: 2018-04-23 | End: 2018-10-08 | Stop reason: SDUPTHER

## 2018-06-26 RX ORDER — MELOXICAM 15 MG/1
TABLET ORAL
Qty: 90 TABLET | Refills: 0 | Status: SHIPPED | OUTPATIENT
Start: 2018-06-26 | End: 2018-07-18 | Stop reason: SDUPTHER

## 2018-07-05 ENCOUNTER — TELEPHONE (OUTPATIENT)
Dept: INTERNAL MEDICINE CLINIC | Age: 68
End: 2018-07-05

## 2018-07-18 ENCOUNTER — OFFICE VISIT (OUTPATIENT)
Dept: INTERNAL MEDICINE CLINIC | Age: 68
End: 2018-07-18

## 2018-07-18 VITALS
HEIGHT: 64 IN | SYSTOLIC BLOOD PRESSURE: 100 MMHG | DIASTOLIC BLOOD PRESSURE: 70 MMHG | BODY MASS INDEX: 18.27 KG/M2 | OXYGEN SATURATION: 98 % | WEIGHT: 107 LBS | HEART RATE: 67 BPM

## 2018-07-18 DIAGNOSIS — Z23 NEED FOR PROPHYLACTIC VACCINATION AGAINST DIPHTHERIA-TETANUS-PERTUSSIS (DTP): Primary | ICD-10-CM

## 2018-07-18 DIAGNOSIS — K59.01 SLOW TRANSIT CONSTIPATION: ICD-10-CM

## 2018-07-18 DIAGNOSIS — D53.9 ANEMIA, MACROCYTIC: ICD-10-CM

## 2018-07-18 DIAGNOSIS — E05.90 HYPERTHYROIDISM: ICD-10-CM

## 2018-07-18 DIAGNOSIS — I10 ESSENTIAL HYPERTENSION: ICD-10-CM

## 2018-07-18 PROCEDURE — G8427 DOCREV CUR MEDS BY ELIG CLIN: HCPCS | Performed by: INTERNAL MEDICINE

## 2018-07-18 PROCEDURE — 3017F COLORECTAL CA SCREEN DOC REV: CPT | Performed by: INTERNAL MEDICINE

## 2018-07-18 PROCEDURE — 1123F ACP DISCUSS/DSCN MKR DOCD: CPT | Performed by: INTERNAL MEDICINE

## 2018-07-18 PROCEDURE — 1101F PT FALLS ASSESS-DOCD LE1/YR: CPT | Performed by: INTERNAL MEDICINE

## 2018-07-18 PROCEDURE — G8419 CALC BMI OUT NRM PARAM NOF/U: HCPCS | Performed by: INTERNAL MEDICINE

## 2018-07-18 PROCEDURE — 99214 OFFICE O/P EST MOD 30 MIN: CPT | Performed by: INTERNAL MEDICINE

## 2018-07-18 PROCEDURE — 1036F TOBACCO NON-USER: CPT | Performed by: INTERNAL MEDICINE

## 2018-07-18 PROCEDURE — 4040F PNEUMOC VAC/ADMIN/RCVD: CPT | Performed by: INTERNAL MEDICINE

## 2018-07-18 RX ORDER — ONDANSETRON 4 MG/1
4 TABLET, FILM COATED ORAL
COMMUNITY
Start: 2018-04-14 | End: 2019-03-26

## 2018-07-18 ASSESSMENT — PATIENT HEALTH QUESTIONNAIRE - PHQ9
SUM OF ALL RESPONSES TO PHQ9 QUESTIONS 1 & 2: 0
SUM OF ALL RESPONSES TO PHQ QUESTIONS 1-9: 0
1. LITTLE INTEREST OR PLEASURE IN DOING THINGS: 0
2. FEELING DOWN, DEPRESSED OR HOPELESS: 0

## 2018-07-18 ASSESSMENT — ENCOUNTER SYMPTOMS
CONSTIPATION: 1
EYES NEGATIVE: 1
RESPIRATORY NEGATIVE: 1

## 2018-07-18 NOTE — PROGRESS NOTES
Subjective:      Patient ID: Susi Ramos is a 80 y.o. male. Hyperthyroidism still a problem. Will follow up with endocrine next month. Has been taking iron pills for anemia. Fatigue level much better now. Hypertension   This is a chronic problem. The problem is unchanged. The problem is controlled. Pertinent negatives include no anxiety or headaches. There are no associated agents to hypertension. Risk factors for coronary artery disease include dyslipidemia and male gender. Past treatments include lifestyle changes, angiotensin blockers and calcium channel blockers. The current treatment provides significant improvement. There are no compliance problems. Identifiable causes of hypertension include a thyroid problem. Constipation   This is a chronic problem. The problem is unchanged. He exercises regularly. There has not been adequate water intake. Associated symptoms include weight loss. Risk factors include dietary change. He has tried stool softeners for the symptoms. The treatment provided significant relief. Review of Systems   Constitutional: Positive for weight loss. HENT: Negative. Eyes: Negative. Respiratory: Negative. Cardiovascular: Negative. Gastrointestinal: Positive for constipation. Genitourinary: Negative. Musculoskeletal: Negative. Skin: Negative. Neurological: Negative. Negative for headaches. Psychiatric/Behavioral: Negative. Objective:   Physical Exam   Constitutional: He is oriented to person, place, and time. He appears well-developed and well-nourished. HENT:   Head: Normocephalic and atraumatic. Left Ear: External ear normal.   Eyes: Conjunctivae and EOM are normal. Pupils are equal, round, and reactive to light. Neck: Normal range of motion. Neck supple. No thyromegaly present. Cardiovascular: Normal rate, regular rhythm and normal heart sounds. No murmur heard.   Pulmonary/Chest: Effort normal and breath sounds normal. No

## 2018-07-26 RX ORDER — NAPROXEN 500 MG/1
TABLET ORAL
Qty: 180 TABLET | Refills: 0 | Status: SHIPPED | OUTPATIENT
Start: 2018-07-26 | End: 2022-03-02

## 2018-10-08 RX ORDER — VALSARTAN AND HYDROCHLOROTHIAZIDE 320; 12.5 MG/1; MG/1
TABLET, FILM COATED ORAL
Qty: 90 TABLET | Refills: 5 | Status: SHIPPED | OUTPATIENT
Start: 2018-10-08 | End: 2019-09-11 | Stop reason: SDUPTHER

## 2018-10-08 RX ORDER — FOLIC ACID 1 MG/1
1 TABLET ORAL DAILY
Qty: 30 TABLET | Refills: 5 | Status: SHIPPED | OUTPATIENT
Start: 2018-10-08 | End: 2019-03-15 | Stop reason: SDUPTHER

## 2018-10-08 RX ORDER — FENOFIBRATE 145 MG/1
145 TABLET, COATED ORAL DAILY
Qty: 30 TABLET | Refills: 3 | Status: SHIPPED | OUTPATIENT
Start: 2018-10-08 | End: 2019-01-14 | Stop reason: SDUPTHER

## 2019-01-15 RX ORDER — FENOFIBRATE 145 MG/1
145 TABLET, COATED ORAL DAILY
Qty: 30 TABLET | Refills: 5 | Status: SHIPPED | OUTPATIENT
Start: 2019-01-15

## 2019-03-18 RX ORDER — FOLIC ACID 1 MG/1
1 TABLET ORAL DAILY
Qty: 30 TABLET | Refills: 5 | Status: SHIPPED | OUTPATIENT
Start: 2019-03-18

## 2019-03-26 ENCOUNTER — OFFICE VISIT (OUTPATIENT)
Dept: INTERNAL MEDICINE CLINIC | Age: 69
End: 2019-03-26
Payer: MEDICARE

## 2019-03-26 VITALS
WEIGHT: 114 LBS | HEART RATE: 77 BPM | OXYGEN SATURATION: 98 % | HEIGHT: 64 IN | SYSTOLIC BLOOD PRESSURE: 130 MMHG | BODY MASS INDEX: 19.46 KG/M2 | DIASTOLIC BLOOD PRESSURE: 90 MMHG

## 2019-03-26 DIAGNOSIS — Z12.5 PROSTATE CANCER SCREENING: ICD-10-CM

## 2019-03-26 DIAGNOSIS — I10 ESSENTIAL HYPERTENSION: ICD-10-CM

## 2019-03-26 DIAGNOSIS — Z12.11 ENCOUNTER FOR SCREENING COLONOSCOPY: Primary | ICD-10-CM

## 2019-03-26 DIAGNOSIS — D52.9 ANEMIA DUE TO FOLIC ACID DEFICIENCY, UNSPECIFIED DEFICIENCY TYPE: ICD-10-CM

## 2019-03-26 DIAGNOSIS — E78.2 MIXED HYPERLIPIDEMIA: ICD-10-CM

## 2019-03-26 LAB
A/G RATIO: 1.7 (ref 1.1–2.2)
ALBUMIN SERPL-MCNC: 4.7 G/DL (ref 3.4–5)
ALP BLD-CCNC: 59 U/L (ref 40–129)
ALT SERPL-CCNC: 10 U/L (ref 10–40)
ANION GAP SERPL CALCULATED.3IONS-SCNC: 16 MMOL/L (ref 3–16)
AST SERPL-CCNC: 16 U/L (ref 15–37)
BILIRUB SERPL-MCNC: 0.7 MG/DL (ref 0–1)
BILIRUBIN URINE: NEGATIVE
BLOOD, URINE: NEGATIVE
BUN BLDV-MCNC: 26 MG/DL (ref 7–20)
CALCIUM SERPL-MCNC: 10 MG/DL (ref 8.3–10.6)
CHLORIDE BLD-SCNC: 102 MMOL/L (ref 99–110)
CHOLESTEROL, TOTAL: 256 MG/DL (ref 0–199)
CLARITY: CLEAR
CO2: 24 MMOL/L (ref 21–32)
COLOR: YELLOW
CREAT SERPL-MCNC: 1.4 MG/DL (ref 0.8–1.3)
EPITHELIAL CELLS, UA: 0 /HPF (ref 0–5)
FOLATE: 18.78 NG/ML (ref 4.78–24.2)
GFR AFRICAN AMERICAN: >60
GFR NON-AFRICAN AMERICAN: 50
GLOBULIN: 2.7 G/DL
GLUCOSE BLD-MCNC: 100 MG/DL (ref 70–99)
GLUCOSE URINE: NEGATIVE MG/DL
HDLC SERPL-MCNC: 82 MG/DL (ref 40–60)
HYALINE CASTS: 0 /LPF (ref 0–8)
KETONES, URINE: NEGATIVE MG/DL
LDL CHOLESTEROL CALCULATED: 149 MG/DL
LEUKOCYTE ESTERASE, URINE: NEGATIVE
MICROSCOPIC EXAMINATION: YES
NITRITE, URINE: NEGATIVE
PH UA: 5.5 (ref 5–8)
POTASSIUM SERPL-SCNC: 4.6 MMOL/L (ref 3.5–5.1)
PROSTATE SPECIFIC ANTIGEN: 0.09 NG/ML (ref 0–4)
PROTEIN UA: ABNORMAL MG/DL
RBC UA: 1 /HPF (ref 0–4)
SODIUM BLD-SCNC: 142 MMOL/L (ref 136–145)
SPECIFIC GRAVITY UA: 1.03 (ref 1–1.03)
TOTAL PROTEIN: 7.4 G/DL (ref 6.4–8.2)
TRIGL SERPL-MCNC: 123 MG/DL (ref 0–150)
URINE TYPE: ABNORMAL
UROBILINOGEN, URINE: 0.2 E.U./DL
VITAMIN B-12: >2000 PG/ML (ref 211–911)
VLDLC SERPL CALC-MCNC: 25 MG/DL
WBC UA: 1 /HPF (ref 0–5)

## 2019-03-26 PROCEDURE — G8427 DOCREV CUR MEDS BY ELIG CLIN: HCPCS | Performed by: INTERNAL MEDICINE

## 2019-03-26 PROCEDURE — 3017F COLORECTAL CA SCREEN DOC REV: CPT | Performed by: INTERNAL MEDICINE

## 2019-03-26 PROCEDURE — 96372 THER/PROPH/DIAG INJ SC/IM: CPT | Performed by: INTERNAL MEDICINE

## 2019-03-26 PROCEDURE — G8484 FLU IMMUNIZE NO ADMIN: HCPCS | Performed by: INTERNAL MEDICINE

## 2019-03-26 PROCEDURE — 99214 OFFICE O/P EST MOD 30 MIN: CPT | Performed by: INTERNAL MEDICINE

## 2019-03-26 PROCEDURE — G8420 CALC BMI NORM PARAMETERS: HCPCS | Performed by: INTERNAL MEDICINE

## 2019-03-26 PROCEDURE — 4040F PNEUMOC VAC/ADMIN/RCVD: CPT | Performed by: INTERNAL MEDICINE

## 2019-03-26 PROCEDURE — 1036F TOBACCO NON-USER: CPT | Performed by: INTERNAL MEDICINE

## 2019-03-26 PROCEDURE — 1123F ACP DISCUSS/DSCN MKR DOCD: CPT | Performed by: INTERNAL MEDICINE

## 2019-03-26 RX ORDER — CYANOCOBALAMIN 1000 UG/ML
1000 INJECTION INTRAMUSCULAR; SUBCUTANEOUS ONCE
Status: COMPLETED | OUTPATIENT
Start: 2019-03-26 | End: 2019-03-26

## 2019-03-26 RX ADMIN — CYANOCOBALAMIN 1000 MCG: 1000 INJECTION INTRAMUSCULAR; SUBCUTANEOUS at 09:57

## 2019-03-26 ASSESSMENT — PATIENT HEALTH QUESTIONNAIRE - PHQ9
SUM OF ALL RESPONSES TO PHQ9 QUESTIONS 1 & 2: 0
SUM OF ALL RESPONSES TO PHQ QUESTIONS 1-9: 0
2. FEELING DOWN, DEPRESSED OR HOPELESS: 0
1. LITTLE INTEREST OR PLEASURE IN DOING THINGS: 0
SUM OF ALL RESPONSES TO PHQ QUESTIONS 1-9: 0

## 2019-03-26 ASSESSMENT — ENCOUNTER SYMPTOMS
EYES NEGATIVE: 1
GASTROINTESTINAL NEGATIVE: 1
RESPIRATORY NEGATIVE: 1

## 2019-03-26 NOTE — PROGRESS NOTES
(FOLVITE) 1 MG tablet Take 1 tablet by mouth daily. 30 tablet 5    fenofibrate (TRICOR) 145 MG tablet Take 1 tablet by mouth daily. 30 tablet 5    valsartan-hydrochlorothiazide (DIOVAN-HCT) 320-12.5 MG per tablet TAKE 1 TABLET EVERY DAY 90 tablet 5    naproxen (NAPROSYN) 500 MG tablet TAKE 1 TABLET BY MOUTH TWICE DAILY WITH A MEAL 180 tablet 0    amLODIPine (NORVASC) 5 MG tablet Take 1 tablet by mouth daily 30 tablet 5    Ascorbic Acid (VITAMIN C) 500 MG tablet Take 500 mg by mouth daily      sildenafil (VIAGRA) 100 MG tablet Take 1 tablet by mouth as needed for Erectile Dysfunction 6 tablet 3     No current facility-administered medications on file prior to visit. Assessment:        Diagnosis Orders   1. Encounter for screening colonoscopy  Rios Curtis MD, Gastroenterology, [facility]   2. Essential hypertension  Comprehensive Metabolic Panel    Urinalysis   3. Mixed hyperlipidemia  Lipid Panel   4. Prostate cancer screening  Psa screening   5.  Anemia due to folic acid deficiency, unspecified deficiency type  VITAMIN B12 & FOLATE           Plan:     continue meds    Refills    Labs    Return in 4 months        C Charleen Ormond, MD

## 2019-09-11 RX ORDER — VALSARTAN AND HYDROCHLOROTHIAZIDE 320; 12.5 MG/1; MG/1
TABLET, FILM COATED ORAL
Qty: 90 TABLET | Refills: 4 | Status: SHIPPED | OUTPATIENT
Start: 2019-09-11 | End: 2020-08-04

## 2020-04-27 ENCOUNTER — HOSPITAL ENCOUNTER (OUTPATIENT)
Age: 70
Discharge: HOME | End: 2020-04-27

## 2020-04-27 LAB — PROSTATE SPECIFIC ANTIGEN: 0.1 NG/ML (ref 0–4)

## 2020-04-27 PROCEDURE — 84153 ASSAY OF PSA TOTAL: CPT

## 2020-04-27 PROCEDURE — 36415 COLL VENOUS BLD VENIPUNCTURE: CPT

## 2020-06-01 ENCOUNTER — OFFICE VISIT (OUTPATIENT)
Dept: INTERNAL MEDICINE CLINIC | Age: 70
End: 2020-06-01
Payer: MEDICARE

## 2020-06-01 VITALS
HEIGHT: 64 IN | DIASTOLIC BLOOD PRESSURE: 72 MMHG | HEART RATE: 86 BPM | SYSTOLIC BLOOD PRESSURE: 120 MMHG | OXYGEN SATURATION: 91 % | WEIGHT: 103 LBS | BODY MASS INDEX: 17.58 KG/M2

## 2020-06-01 PROCEDURE — 99204 OFFICE O/P NEW MOD 45 MIN: CPT | Performed by: INTERNAL MEDICINE

## 2020-06-01 RX ORDER — SILDENAFIL 100 MG/1
100 TABLET, FILM COATED ORAL PRN
Qty: 6 TABLET | Refills: 3 | Status: SHIPPED | OUTPATIENT
Start: 2020-06-01

## 2020-06-01 RX ORDER — TRAMADOL HYDROCHLORIDE 50 MG/1
50 TABLET ORAL DAILY PRN
Qty: 30 TABLET | Refills: 1 | Status: SHIPPED | OUTPATIENT
Start: 2020-06-01 | End: 2020-07-01

## 2020-06-01 ASSESSMENT — PATIENT HEALTH QUESTIONNAIRE - PHQ9
SUM OF ALL RESPONSES TO PHQ QUESTIONS 1-9: 0
2. FEELING DOWN, DEPRESSED OR HOPELESS: 0
SUM OF ALL RESPONSES TO PHQ QUESTIONS 1-9: 0
SUM OF ALL RESPONSES TO PHQ9 QUESTIONS 1 & 2: 0
1. LITTLE INTEREST OR PLEASURE IN DOING THINGS: 0

## 2020-06-01 ASSESSMENT — ENCOUNTER SYMPTOMS
RESPIRATORY NEGATIVE: 1
ALLERGIC/IMMUNOLOGIC NEGATIVE: 1
GASTROINTESTINAL NEGATIVE: 1
EYES NEGATIVE: 1

## 2020-06-01 NOTE — PATIENT INSTRUCTIONS
Chronic arm pain  Start Tramadol as needed  Do not take tramadol if you will have a drink  Start topical Biomed cream     Weight loss  Check labs at [address] sure to fast for 10 hours   [address], [place], [address]   Phone: [phone]      High Cholesterol  Check fasting lipids    High Blood Pressure  Check fasting labs

## 2020-06-01 NOTE — PROGRESS NOTES
soft.      Tenderness: There is no abdominal tenderness. Musculoskeletal:         General: No deformity. Left elbow: He exhibits normal range of motion and no swelling. Tenderness found. Medial epicondyle and lateral epicondyle tenderness noted. Lymphadenopathy:      Cervical: No cervical adenopathy. Skin:     General: Skin is warm and dry. Findings: No rash. Neurological:      Mental Status: He is alert. Cranial Nerves: No cranial nerve deficit. Sensory: No sensory deficit. Gait: Gait normal.           Results for Kaycee Payne (MRN <[id number]>) as of 6/1/2020 10:32   Ref.  Range 1/9/2018 10:19 4/11/2018 09:32 3/26/2019 10:10   Sodium Latest Ref Range: 136 - 145 mmol/L   142   Potassium Latest Ref Range: 3.5 - 5.1 mmol/L   4.6   Chloride Latest Ref Range: 99 - 110 mmol/L   102   CO2 Latest Ref Range: 21 - 32 mmol/L   24   BUN Latest Ref Range: 7 - 20 mg/dL   26 (H)   Creatinine Latest Ref Range: 0.8 - 1.3 mg/dL   1.4 (H)   Anion Gap Latest Ref Range: 3 - 16    16   GFR Non- Latest Ref Range: >60    50 (A)   GFR  Latest Ref Range: >60    >60   Glucose Latest Ref Range: 70 - 99 mg/dL   100 (H)   Calcium Latest Ref Range: 8.3 - 10.6 mg/dL   10.0   Total Protein Latest Ref Range: 6.4 - 8.2 g/dL  7.3 7.4   Cholesterol, Total Latest Ref Range: 0 - 199 mg/dL 222 (H) 224 (H) 256 (H)   HDL Cholesterol Latest Ref Range: 40 - 60 mg/dL 45 37 (L) 82 (H)   LDL Calculated Latest Ref Range: <100 mg/dL see below see below 149 (H)   LDL Direct Latest Ref Range: <100 mg/dL 101 (H) 50    Triglycerides Latest Ref Range: 0 - 150 mg/dL 380 (H) 1,016 (H) 123   VLDL Cholesterol Calculated Latest Ref Range: Not Established mg/dL see below see below 25   Albumin Latest Ref Range: 3.4 - 5.0 g/dL  4.6 4.7   Globulin Latest Units: g/dL   2.7   Albumin/Globulin Ratio Latest Ref Range: 1.1 - 2.2    1.7   Alk Phos Latest Ref Range: 40 - 129 U/L  77 59   ALT Latest Ref Range: 10 - 40 U/L  20 10   AST Latest Ref Range: 15 - 37 U/L  19 16   Bilirubin Latest Ref Range: 0.0 - 1.0 mg/dL  0.5 0.7   Bilirubin, Direct Latest Ref Range: 0.0 - 0.3 mg/dL  <0.2    Bilirubin, Indirect Latest Ref Range: 0.0 - 1.0 mg/dL  see below    T4, Total Latest Ref Range: 4.5 - 10.9 ug/dL  5.0    TSH Reflex FT4 Latest Ref Range: 0.27 - 4.20 uIU/mL 3.63     Assessment:    1. Chronic pain of left elbow  Patient with chronic elbow pain. There is a retained screw from previous surgery. Explained to patient chronic narcotics is not ideal for this condition. Recommend topical analgesic. He can take tramadol sparingly for severe pain  - traMADol (ULTRAM) 50 MG tablet; Take 1 tablet by mouth daily as needed for Pain for up to 30 days. Intended supply: 5 days. Take lowest dose possible to manage pain  Dispense: 30 tablet; Refill: 1    2. Abnormal weight loss  Check A1c, thyroid, and CMP. - Comprehensive Metabolic Panel; Future  - Hemoglobin A1C; Future  - TSH with Reflex; Future    3. Hyperglycemia  Patient with hyperglycemia in the past.  Check A1c.  - Hemoglobin A1C; Future    4. Screen for colon cancer  Refer for colonoscopy  - AFL - Tita Dove MD, Gastroenterology, [facility]    5. Hyperlipidemia, unspecified hyperlipidemia type  Check fasting lipids  - Lipid Panel; Future    6. MGUS (monoclonal gammopathy of unknown significance)  Check CBC  - CBC Auto Differential; Future    7. Anemia, macrocytic  Check CBC  - CBC Auto Differential; Future    8. Essential hypertension    - TSH with Reflex; Future  - CBC Auto Differential; Future    9. Erectile dysfunction, unspecified erectile dysfunction type    - sildenafil (VIAGRA) 100 MG tablet; Take 1 tablet by mouth as needed for Erectile Dysfunction  Dispense: 6 tablet;  Refill: 3       Plan/Patient Instructions:    Patient Instructions   Chronic arm pain  Start Tramadol as needed  Do not take tramadol if you will have a drink  Start topical Biomed cream Weight loss  Check labs at [address] sure to fast for 10 hours   [address], [place], [address]   Phone: [phone]      High Cholesterol  Check fasting lipids    High Blood Pressure  Check fasting labs        Return in about 6 weeks (around 7/13/2020) for HTN, Weight loss. [address]      Documentation was done using voice recognition dragon software. Every effort was made to ensure accuracy; however, inadvertent, unintentional computerized transcription errors may be present.

## 2020-07-10 ENCOUNTER — TELEPHONE (OUTPATIENT)
Dept: INTERNAL MEDICINE CLINIC | Age: 70
End: 2020-07-10

## 2020-07-10 DIAGNOSIS — D47.2 MGUS (MONOCLONAL GAMMOPATHY OF UNKNOWN SIGNIFICANCE): ICD-10-CM

## 2020-07-10 DIAGNOSIS — D53.9 ANEMIA, MACROCYTIC: ICD-10-CM

## 2020-07-10 DIAGNOSIS — I10 ESSENTIAL HYPERTENSION: ICD-10-CM

## 2020-07-10 DIAGNOSIS — E78.5 HYPERLIPIDEMIA, UNSPECIFIED HYPERLIPIDEMIA TYPE: ICD-10-CM

## 2020-07-10 DIAGNOSIS — R73.9 HYPERGLYCEMIA: ICD-10-CM

## 2020-07-10 DIAGNOSIS — R63.4 ABNORMAL WEIGHT LOSS: ICD-10-CM

## 2020-07-10 LAB
A/G RATIO: 1.6 (ref 1.1–2.2)
ALBUMIN SERPL-MCNC: 4.1 G/DL (ref 3.4–5)
ALP BLD-CCNC: 64 U/L (ref 40–129)
ALT SERPL-CCNC: 17 U/L (ref 10–40)
ANION GAP SERPL CALCULATED.3IONS-SCNC: 13 MMOL/L (ref 3–16)
AST SERPL-CCNC: 29 U/L (ref 15–37)
BASOPHILS ABSOLUTE: 0 K/UL (ref 0–0.2)
BASOPHILS RELATIVE PERCENT: 1.3 %
BILIRUB SERPL-MCNC: 0.4 MG/DL (ref 0–1)
BUN BLDV-MCNC: 18 MG/DL (ref 7–20)
CALCIUM SERPL-MCNC: 9.4 MG/DL (ref 8.3–10.6)
CHLORIDE BLD-SCNC: 104 MMOL/L (ref 99–110)
CHOLESTEROL, TOTAL: 214 MG/DL (ref 0–199)
CO2: 25 MMOL/L (ref 21–32)
CREAT SERPL-MCNC: 0.9 MG/DL (ref 0.8–1.3)
EOSINOPHILS ABSOLUTE: 0.1 K/UL (ref 0–0.6)
EOSINOPHILS RELATIVE PERCENT: 2.1 %
GFR AFRICAN AMERICAN: >60
GFR NON-AFRICAN AMERICAN: >60
GLOBULIN: 2.6 G/DL
GLUCOSE BLD-MCNC: 86 MG/DL (ref 70–99)
HCT VFR BLD CALC: 38.9 % (ref 40.5–52.5)
HDLC SERPL-MCNC: 89 MG/DL (ref 40–60)
HEMOGLOBIN: 12.9 G/DL (ref 13.5–17.5)
LDL CHOLESTEROL CALCULATED: 95 MG/DL
LYMPHOCYTES ABSOLUTE: 1.3 K/UL (ref 1–5.1)
LYMPHOCYTES RELATIVE PERCENT: 42.5 %
MCH RBC QN AUTO: 35 PG (ref 26–34)
MCHC RBC AUTO-ENTMCNC: 33.1 G/DL (ref 31–36)
MCV RBC AUTO: 105.8 FL (ref 80–100)
MONOCYTES ABSOLUTE: 0.2 K/UL (ref 0–1.3)
MONOCYTES RELATIVE PERCENT: 7.5 %
NEUTROPHILS ABSOLUTE: 1.5 K/UL (ref 1.7–7.7)
NEUTROPHILS RELATIVE PERCENT: 46.6 %
PDW BLD-RTO: 16.2 % (ref 12.4–15.4)
PLATELET # BLD: 206 K/UL (ref 135–450)
PMV BLD AUTO: 7.7 FL (ref 5–10.5)
POTASSIUM SERPL-SCNC: 4.9 MMOL/L (ref 3.5–5.1)
RBC # BLD: 3.68 M/UL (ref 4.2–5.9)
SODIUM BLD-SCNC: 142 MMOL/L (ref 136–145)
TOTAL PROTEIN: 6.7 G/DL (ref 6.4–8.2)
TRIGL SERPL-MCNC: 152 MG/DL (ref 0–150)
TSH REFLEX: 1.63 UIU/ML (ref 0.27–4.2)
VLDLC SERPL CALC-MCNC: 30 MG/DL
WBC # BLD: 3.2 K/UL (ref 4–11)

## 2020-07-10 NOTE — TELEPHONE ENCOUNTER
[facility] received a call from:    Name of Caller: the pt and his daughter Tianna Pineda on the line     Relationship to patient: self and daughter     Organization name: n/a     Best contact number: [phone] okay to leave      Reason for call: pt and daughter wanted to know if it was okay for the pt to have a telephone visit not a VV ? Pt doesn't have the capabilities of VV only telephone . Mejia Brice If  Is unable to please confirm with daughter with permission by father (the pt ) to confirm the actual appt type this pt will be having on 7/13.

## 2020-07-11 LAB
ESTIMATED AVERAGE GLUCOSE: 96.8 MG/DL
HBA1C MFR BLD: 5 %

## 2020-07-13 ENCOUNTER — OFFICE VISIT (OUTPATIENT)
Dept: INTERNAL MEDICINE CLINIC | Age: 70
End: 2020-07-13
Payer: MEDICARE

## 2020-07-13 VITALS
DIASTOLIC BLOOD PRESSURE: 74 MMHG | WEIGHT: 101.2 LBS | TEMPERATURE: 98.4 F | HEART RATE: 68 BPM | BODY MASS INDEX: 17.37 KG/M2 | SYSTOLIC BLOOD PRESSURE: 142 MMHG | RESPIRATION RATE: 12 BRPM

## 2020-07-13 PROCEDURE — 3017F COLORECTAL CA SCREEN DOC REV: CPT | Performed by: INTERNAL MEDICINE

## 2020-07-13 PROCEDURE — 99214 OFFICE O/P EST MOD 30 MIN: CPT | Performed by: INTERNAL MEDICINE

## 2020-07-13 PROCEDURE — G8427 DOCREV CUR MEDS BY ELIG CLIN: HCPCS | Performed by: INTERNAL MEDICINE

## 2020-07-13 PROCEDURE — 1123F ACP DISCUSS/DSCN MKR DOCD: CPT | Performed by: INTERNAL MEDICINE

## 2020-07-13 PROCEDURE — G8419 CALC BMI OUT NRM PARAM NOF/U: HCPCS | Performed by: INTERNAL MEDICINE

## 2020-07-13 PROCEDURE — 1036F TOBACCO NON-USER: CPT | Performed by: INTERNAL MEDICINE

## 2020-07-13 PROCEDURE — 4040F PNEUMOC VAC/ADMIN/RCVD: CPT | Performed by: INTERNAL MEDICINE

## 2020-07-13 NOTE — PROGRESS NOTES
[address]  Phone: [phone]           Patient Name: Janice Spear    YOB: 1950    Today's Date: 7/13/20           Chief Complaint   Patient presents with    Check-Up     Discuss lab results    Hypertension    Weight Loss          Subjective:  Patient is currently drinking 6 pack of \"tall boys\" each day over the weekend    Hypertension   This is a chronic problem. The problem is uncontrolled. Pertinent negatives include no anxiety, blurred vision, chest pain, headaches, malaise/fatigue, neck pain, orthopnea, palpitations, peripheral edema, PND, shortness of breath or sweats. There are no associated agents to hypertension. Risk factors for coronary artery disease include male gender. Past treatments include angiotensin blockers. Compliance problems: EtOH use. There is no history of chronic renal disease, coarctation of the aorta, hyperaldosteronism, hypercortisolism, hyperparathyroidism, a hypertension causing med, pheochromocytoma, renovascular disease, sleep apnea or a thyroid problem. History:     Past Medical History:   Diagnosis Date    Boxer's fracture 7/2011    right    Carpal tunnel syndrome on both sides     Dr. Adamaris Buchanan Disc disease, degenerative, cervical     displacement and cervical stenosis    Hypercholesterolemia     Hypertension     Left humeral fracture 2019    Prostate cancer [facility])      prostate       Current Outpatient Medications on File Prior to Visit   Medication Sig Dispense Refill    sildenafil (VIAGRA) 100 MG tablet Take 1 tablet by mouth as needed for Erectile Dysfunction 6 tablet 3    valsartan-hydrochlorothiazide (DIOVAN-HCT) 320-12.5 MG per tablet Take 1 tablet by mouth daily. 90 tablet 4    folic acid (FOLVITE) 1 MG tablet Take 1 tablet by mouth daily. 30 tablet 5    fenofibrate (TRICOR) 145 MG tablet Take 1 tablet by mouth daily.  30 tablet 5    naproxen (NAPROSYN) 500 MG tablet TAKE 1 TABLET BY MOUTH TWICE DAILY WITH A MEAL 180 tablet 0    amLODIPine (NORVASC) 5 MG tablet Take 1 tablet by mouth daily 30 tablet 5    Ascorbic Acid (VITAMIN C) 500 MG tablet Take 500 mg by mouth daily       No current facility-administered medications on file prior to visit. Social History     Tobacco Use    Smoking status: Former Smoker     Packs/day: 0.25     Years: 0.00     Pack years: 0.00     Types: Cigarettes     Last attempt to quit: 2012     Years since quittin.5    Smokeless tobacco: Never Used   Substance Use Topics    Alcohol use: Yes     Comment: Drinks on the weekends while watching sports          Review of Systems:    Review of Systems   Constitutional: Negative for malaise/fatigue. Eyes: Negative for blurred vision. Respiratory: Negative for shortness of breath. Cardiovascular: Negative for chest pain, palpitations, orthopnea and PND. Musculoskeletal: Negative for neck pain. Neurological: Negative for headaches. Objective:    Vitals:    20 1140 20 1143   BP: (!) 142/76 (!) 142/74   Site: Right Upper Arm Left Upper Arm   Pulse: 68    Resp: 12    Temp: 98.4 °F (36.9 °C)    TempSrc: Temporal    Weight: 101 lb 3.2 oz (45.9 kg)      Wt Readings from Last 3 Encounters:   20 101 lb 3.2 oz (45.9 kg)   20 103 lb (46.7 kg)   19 114 lb (51.7 kg)       Body mass index is 17.37 kg/m². Physical Exam  Constitutional:       General: He is not in acute distress. Appearance: He is well-developed. HENT:      Head: Normocephalic. Mouth/Throat:      Pharynx: No oropharyngeal exudate. Cardiovascular:      Rate and Rhythm: Normal rate and regular rhythm. Heart sounds: Normal heart sounds. No murmur. Pulmonary:      Effort: Pulmonary effort is normal.      Breath sounds: Normal breath sounds. Abdominal:      General: There is no distension. Palpations: Abdomen is soft. Tenderness:  There is no abdominal tenderness. Skin:     General: Skin is warm. Findings: No rash. Assessment:    1. MGUS (monoclonal gammopathy of unknown significance)  [facility] oncology  [facility]    2. Anemia, macrocytic  Refer to oncology. Patient does drink excessively on the weekends which is likely contributing, however must rule out malignancy  - [facility]    3. Neutropenia, unspecified type ([facility] [address]    [facility]    4. Essential hypertension  Fair control. Continue current medications for now        Plan/Patient Instructions:    Patient Instructions   Refer to Hematology and Oncology  Continue current blood pressure medications         Return in about 4 months (around 11/13/2020) for HTN. [address]       Documentation was done using voice recognition dragon software. Every effort was made to ensure accuracy; however, inadvertent, unintentional computerized transcription errors may be present.

## 2020-07-27 ASSESSMENT — ENCOUNTER SYMPTOMS
SHORTNESS OF BREATH: 0
BLURRED VISION: 0
ORTHOPNEA: 0

## 2020-08-04 RX ORDER — VALSARTAN AND HYDROCHLOROTHIAZIDE 320; 12.5 MG/1; MG/1
TABLET, FILM COATED ORAL
Qty: 90 TABLET | Refills: 1 | Status: SHIPPED | OUTPATIENT
Start: 2020-08-04 | End: 2021-02-15

## 2020-10-26 ENCOUNTER — HOSPITAL ENCOUNTER (OUTPATIENT)
Dept: GENERAL RADIOLOGY | Age: 70
Discharge: HOME | End: 2020-10-26
Payer: MEDICARE

## 2020-10-26 PROCEDURE — 77074 RADEX OSSEOUS SURVEY LMTD: CPT

## 2020-11-16 ENCOUNTER — OFFICE VISIT (OUTPATIENT)
Dept: INTERNAL MEDICINE CLINIC | Age: 70
End: 2020-11-16
Payer: MEDICARE

## 2020-11-16 VITALS
OXYGEN SATURATION: 98 % | DIASTOLIC BLOOD PRESSURE: 72 MMHG | BODY MASS INDEX: 18.37 KG/M2 | SYSTOLIC BLOOD PRESSURE: 134 MMHG | HEART RATE: 76 BPM | WEIGHT: 107 LBS

## 2020-11-16 PROCEDURE — G8419 CALC BMI OUT NRM PARAM NOF/U: HCPCS | Performed by: INTERNAL MEDICINE

## 2020-11-16 PROCEDURE — 99214 OFFICE O/P EST MOD 30 MIN: CPT | Performed by: INTERNAL MEDICINE

## 2020-11-16 PROCEDURE — 1036F TOBACCO NON-USER: CPT | Performed by: INTERNAL MEDICINE

## 2020-11-16 PROCEDURE — 3017F COLORECTAL CA SCREEN DOC REV: CPT | Performed by: INTERNAL MEDICINE

## 2020-11-16 PROCEDURE — G8484 FLU IMMUNIZE NO ADMIN: HCPCS | Performed by: INTERNAL MEDICINE

## 2020-11-16 PROCEDURE — 1123F ACP DISCUSS/DSCN MKR DOCD: CPT | Performed by: INTERNAL MEDICINE

## 2020-11-16 PROCEDURE — G8427 DOCREV CUR MEDS BY ELIG CLIN: HCPCS | Performed by: INTERNAL MEDICINE

## 2020-11-16 PROCEDURE — 4040F PNEUMOC VAC/ADMIN/RCVD: CPT | Performed by: INTERNAL MEDICINE

## 2020-11-16 ASSESSMENT — ENCOUNTER SYMPTOMS
SHORTNESS OF BREATH: 0
ORTHOPNEA: 0
BLURRED VISION: 0

## 2020-11-16 NOTE — PROGRESS NOTES
Packs/day: 0.25     Years: 0.00     Pack years: 0.00     Types: Cigarettes     Last attempt to quit: 2012     Years since quittin.8    Smokeless tobacco: Never Used   Substance Use Topics    Alcohol use: Yes     Comment: Drinks on the weekends while watching sports          Review of Systems:    Review of Systems   Constitutional: Negative for malaise/fatigue. Eyes: Negative for blurred vision. Respiratory: Negative for shortness of breath. Cardiovascular: Negative for chest pain, palpitations, orthopnea and PND. Musculoskeletal: Negative for neck pain. Neurological: Negative for headaches. Objective:    Vitals:    20 1111   BP: 134/72   Pulse: 76   SpO2: 98%   Weight: 107 lb (48.5 kg)     Wt Readings from Last 3 Encounters:   20 107 lb (48.5 kg)   20 101 lb 3.2 oz (45.9 kg)   20 103 lb (46.7 kg)       Body mass index is 18.37 kg/m². Physical Exam  Constitutional:       General: He is not in acute distress. Appearance: He is well-developed. HENT:      Head: Normocephalic. Mouth/Throat:      Pharynx: No oropharyngeal exudate. Cardiovascular:      Rate and Rhythm: Normal rate and regular rhythm. Heart sounds: Normal heart sounds. No murmur. Pulmonary:      Effort: Pulmonary effort is normal.      Breath sounds: Normal breath sounds. Abdominal:      General: There is no distension. Palpations: Abdomen is soft. Tenderness: There is no abdominal tenderness. Skin:     General: Skin is warm. Findings: No rash. Results for Robert Willis (MRN <[id number]>) as of 2020 11:51   Ref.  Range 7/10/2020 10:05   Sodium Latest Ref Range: 136 - 145 mmol/L 142   Potassium Latest Ref Range: 3.5 - 5.1 mmol/L 4.9   Chloride Latest Ref Range: 99 - 110 mmol/L 104   CO2 Latest Ref Range: 21 - 32 mmol/L 25   BUN Latest Ref Range: 7 - 20 mg/dL 18   Creatinine Latest Ref Range: 0.8 - 1.3 mg/dL 0.9   Anion Gap Latest Ref Range: 3 - 16  13 GFR Non- Latest Ref Range: >60  >60   GFR  Latest Ref Range: >60  >60   Glucose Latest Ref Range: 70 - 99 mg/dL 86   Calcium Latest Ref Range: 8.3 - 10.6 mg/dL 9.4   Total Protein Latest Ref Range: 6.4 - 8.2 g/dL 6.7   Cholesterol, Total Latest Ref Range: 0 - 199 mg/dL 214 (H)   HDL Cholesterol Latest Ref Range: 40 - 60 mg/dL 89 (H)   LDL Calculated Latest Ref Range: <100 mg/dL 95   Triglycerides Latest Ref Range: 0 - 150 mg/dL 152 (H)   VLDL Cholesterol Calculated Latest Ref Range: Not Established mg/dL 30   Albumin Latest Ref Range: 3.4 - 5.0 g/dL 4.1   Globulin Latest Units: g/dL 2.6   Albumin/Globulin Ratio Latest Ref Range: 1.1 - 2.2  1.6   Alk Phos Latest Ref Range: 40 - 129 U/L 64   ALT Latest Ref Range: 10 - 40 U/L 17   AST Latest Ref Range: 15 - 37 U/L 29   Bilirubin Latest Ref Range: 0.0 - 1.0 mg/dL 0.4   Hemoglobin A1C Latest Ref Range: See comment % 5.0   eAG (mg/dL) Latest Units: mg/dL 96.8   TSH Latest Ref Range: 0.27 - 4.20 uIU/mL 1.63   WBC Latest Ref Range: 4.0 - 11.0 K/uL 3.2 (L)   RBC Latest Ref Range: 4.20 - 5.90 M/uL 3.68 (L)   Hemoglobin Quant Latest Ref Range: 13.5 - 17.5 g/dL 12.9 (L)   Hematocrit Latest Ref Range: 40.5 - 52.5 % 38.9 (L)   MCV Latest Ref Range: 80.0 - 100.0 fL 105.8 (H)   MCH Latest Ref Range: 26.0 - 34.0 pg 35.0 (H)   MCHC Latest Ref Range: 31.0 - 36.0 g/dL 33.1   MPV Latest Ref Range: 5.0 - 10.5 fL 7.7   RDW Latest Ref Range: 12.4 - 15.4 % 16.2 (H)   Platelet Count Latest Ref Range: 135 - 450 K/uL 206   Neutrophils % Latest Units: % 46.6   Lymphocyte % Latest Units: % 42.5   Monocytes % Latest Units: % 7.5   Eosinophils % Latest Units: % 2.1   Basophils % Latest Units: % 1.3   Neutrophils Absolute Latest Ref Range: 1.7 - 7.7 K/uL 1.5 (L)   Lymphocytes Absolute Latest Ref Range: 1.0 - 5.1 K/uL 1.3   Monocytes Absolute Latest Ref Range: 0.0 - 1.3 K/uL 0.2   Eosinophils Absolute Latest Ref Range: 0.0 - 0.6 K/uL 0.1   Basophils Absolute Latest Ref Range: 0.0 - 0.2 K/uL 0.0     Assessment:    1. Essential hypertension  Well controlled     2. MGUS (monoclonal gammopathy of unknown significance)  Followed by [facility]     3. Abnormal weight loss  Improved         Plan/Patient Instructions:    Patient Instructions   Continue Diovan-HCT  Continue amlodipine   You are up 6 pounds!!!       Return in about 6 months (around 5/16/2021) for Medicare GREGORY Kruger       Documentation was done using voice recognition dragon software. Every effort was made to ensure accuracy; however, inadvertent, unintentional computerized transcription errors may be present.

## 2021-02-03 ENCOUNTER — TELEPHONE (OUTPATIENT)
Dept: INTERNAL MEDICINE CLINIC | Age: 71
End: 2021-02-03

## 2021-02-03 NOTE — TELEPHONE ENCOUNTER
----- Message from Sydnie Roberts sent at 2/3/2021  7:48 AM EST -----  Subject: Message to Provider    QUESTIONS  Information for Provider? Patient would like the Dr to right him a   prescription to get a lift chair   states it is hard for him to get up and for him to walk around.   ---------------------------------------------------------------------------  --------------  [address]  What is the best way for the office to contact you? OK to leave message on   voicemail  Preferred Call Back Phone Number? [phone]  ---------------------------------------------------------------------------  --------------  SCRIPT ANSWERS  Relationship to Patient?  Self

## 2021-02-15 RX ORDER — VALSARTAN AND HYDROCHLOROTHIAZIDE 320; 12.5 MG/1; MG/1
TABLET, FILM COATED ORAL
Qty: 90 TABLET | Refills: 1 | Status: SHIPPED | OUTPATIENT
Start: 2021-02-15 | End: 2021-07-27

## 2021-02-15 NOTE — TELEPHONE ENCOUNTER
Recent Visits  Date Type Provider Dept   11/16/20 Office Visit Alex Roberts MD [facility]   07/13/20 Office Visit Alex Roberts MD [facility]   06/01/20 Office Visit Alex Roberts MD [facility]   Showing recent visits within past 540 days with a meds authorizing provider and meeting all other requirements     Future Appointments  Date Type Provider Dept   03/23/21 Appointment Alex Roberts MD [facility]   05/17/21 Appointment Alex Roberts MD [facility]   Showing future appointments within next 150 days with a meds authorizing provider and meeting all other requirements      11/16/2020

## 2021-03-23 ENCOUNTER — OFFICE VISIT (OUTPATIENT)
Dept: INTERNAL MEDICINE CLINIC | Age: 71
End: 2021-03-23
Payer: MEDICARE

## 2021-03-23 VITALS
TEMPERATURE: 97.5 F | DIASTOLIC BLOOD PRESSURE: 78 MMHG | SYSTOLIC BLOOD PRESSURE: 122 MMHG | WEIGHT: 119.6 LBS | HEART RATE: 60 BPM | OXYGEN SATURATION: 98 % | BODY MASS INDEX: 20.53 KG/M2

## 2021-03-23 DIAGNOSIS — G89.29 CHRONIC PAIN OF LEFT ELBOW: Primary | ICD-10-CM

## 2021-03-23 DIAGNOSIS — Z91.81 FALLS INFREQUENTLY: ICD-10-CM

## 2021-03-23 DIAGNOSIS — D47.2 MGUS (MONOCLONAL GAMMOPATHY OF UNKNOWN SIGNIFICANCE): ICD-10-CM

## 2021-03-23 DIAGNOSIS — S00.452A EMBEDDED EARRING OF LEFT EAR, INITIAL ENCOUNTER: ICD-10-CM

## 2021-03-23 DIAGNOSIS — G56.03 BILATERAL CARPAL TUNNEL SYNDROME: ICD-10-CM

## 2021-03-23 DIAGNOSIS — G95.20 CERVICAL CORD COMPRESSION WITH MYELOPATHY (HCC): ICD-10-CM

## 2021-03-23 DIAGNOSIS — M25.522 CHRONIC PAIN OF LEFT ELBOW: Primary | ICD-10-CM

## 2021-03-23 PROCEDURE — 99214 OFFICE O/P EST MOD 30 MIN: CPT | Performed by: INTERNAL MEDICINE

## 2021-03-23 PROCEDURE — G8420 CALC BMI NORM PARAMETERS: HCPCS | Performed by: INTERNAL MEDICINE

## 2021-03-23 PROCEDURE — 3017F COLORECTAL CA SCREEN DOC REV: CPT | Performed by: INTERNAL MEDICINE

## 2021-03-23 PROCEDURE — G8427 DOCREV CUR MEDS BY ELIG CLIN: HCPCS | Performed by: INTERNAL MEDICINE

## 2021-03-23 PROCEDURE — 4040F PNEUMOC VAC/ADMIN/RCVD: CPT | Performed by: INTERNAL MEDICINE

## 2021-03-23 PROCEDURE — G8484 FLU IMMUNIZE NO ADMIN: HCPCS | Performed by: INTERNAL MEDICINE

## 2021-03-23 PROCEDURE — 1123F ACP DISCUSS/DSCN MKR DOCD: CPT | Performed by: INTERNAL MEDICINE

## 2021-03-23 PROCEDURE — 1036F TOBACCO NON-USER: CPT | Performed by: INTERNAL MEDICINE

## 2021-03-23 ASSESSMENT — PATIENT HEALTH QUESTIONNAIRE - PHQ9
2. FEELING DOWN, DEPRESSED OR HOPELESS: 0
SUM OF ALL RESPONSES TO PHQ QUESTIONS 1-9: 0
1. LITTLE INTEREST OR PLEASURE IN DOING THINGS: 0
SUM OF ALL RESPONSES TO PHQ QUESTIONS 1-9: 0

## 2021-03-23 NOTE — ASSESSMENT & PLAN NOTE
Prescription for lift chair completed. Patient would benefit from a lift chair to assist with standing as it is difficult for him to use his arms, and to prevent further falls.

## 2021-03-23 NOTE — PROGRESS NOTES
Russel Gruber (:  1950) is a 72 y.o. male,Established patient, here for evaluation of the following chief complaint(s): Other (Chair lift order)      ASSESSMENT/PLAN:  1. Chronic pain of left elbow  Assessment & Plan:  Prescription for lift chair provided    Orders:  -     Lift Chair MISC; Starting Tue 3/23/2021, Disp-1 each, R-0, Print  2. Embedded earring of left ear, initial encounter  Assessment & Plan:  Earring removed in clinic today. Area was cleansed with hydrogen peroxide. The back piece of the earring was embedded under the skin but I was able to manipulate a piece of the earring back and grab it with tweezers. The earring and earlobe were cleansed with hydrogen peroxide. Patient requested the earring be replaced as he cannot do it himself and because he did not want the hole to close. Earring was replaced, however the backing was not placed as tight. Explained to patient the earring may fall out so he should watch it closely. 3. Falls infrequently  Assessment & Plan:  Prescription for lift chair provided    Orders:  -     Lift Chair MISC; Starting Tue 3/23/2021, Disp-1 each, R-0, Print  4. Bilateral carpal tunnel syndrome  Assessment & Plan:  Prescription for lift chair completed. Patient would benefit from a lift chair to assist with standing as it is difficult for him to use his arms, and to prevent further falls. Orders:  -     Lift Chair MISC; Starting Tue 3/23/2021, Disp-1 each, R-0, Print  5. Cervical cord compression with myelopathy [facility])  Assessment & Plan:  Prescription for lift chair provided    Orders:  -     Lift Chair MISC; Starting Tue 3/23/2021, Disp-1 each, R-0, Print  6. MGUS (monoclonal gammopathy of unknown significance)  -     Lift Chair MISC; Starting Tue 3/23/2021, Disp-1 each, R-0, Print      Patient Instructions   Script will be sent to [facility] as scheduled. SUBJECTIVE/OBJECTIVE:  HPI   Patient with pain with standing.   He fell while trying

## 2021-05-13 ENCOUNTER — HOSPITAL ENCOUNTER (EMERGENCY)
Age: 71
Discharge: HOME | End: 2021-05-13
Attending: EMERGENCY MEDICINE
Payer: MEDICARE

## 2021-05-13 ENCOUNTER — APPOINTMENT (OUTPATIENT)
Dept: GENERAL RADIOLOGY | Age: 71
End: 2021-05-13
Payer: MEDICARE

## 2021-05-13 VITALS
TEMPERATURE: 98.4 F | OXYGEN SATURATION: 98 % | HEART RATE: 75 BPM | DIASTOLIC BLOOD PRESSURE: 90 MMHG | SYSTOLIC BLOOD PRESSURE: 185 MMHG | RESPIRATION RATE: 18 BRPM

## 2021-05-13 DIAGNOSIS — R07.89 LEFT-SIDED CHEST WALL PAIN: Primary | ICD-10-CM

## 2021-05-13 LAB
ANION GAP SERPL CALCULATED.3IONS-SCNC: 12 MMOL/L (ref 3–16)
BASOPHILS ABSOLUTE: 0 K/UL (ref 0–0.2)
BASOPHILS RELATIVE PERCENT: 0.9 %
BILIRUBIN URINE: ABNORMAL
BLOOD, URINE: NEGATIVE
BUN BLDV-MCNC: 23 MG/DL (ref 7–20)
CALCIUM SERPL-MCNC: 9.3 MG/DL (ref 8.3–10.6)
CHLORIDE BLD-SCNC: 99 MMOL/L (ref 99–110)
CLARITY: CLEAR
CO2: 25 MMOL/L (ref 21–32)
COLOR: YELLOW
CREAT SERPL-MCNC: 1.4 MG/DL (ref 0.8–1.3)
EOSINOPHILS ABSOLUTE: 0 K/UL (ref 0–0.6)
EOSINOPHILS RELATIVE PERCENT: 0.5 %
GFR AFRICAN AMERICAN: >60
GFR NON-AFRICAN AMERICAN: 50
GLUCOSE BLD-MCNC: 99 MG/DL (ref 70–99)
GLUCOSE URINE: NEGATIVE MG/DL
HCT VFR BLD CALC: 40.3 % (ref 40.5–52.5)
HEMOGLOBIN: 14.1 G/DL (ref 13.5–17.5)
HYALINE CASTS: ABNORMAL /LPF (ref 0–2)
KETONES, URINE: ABNORMAL MG/DL
LEUKOCYTE ESTERASE, URINE: NEGATIVE
LYMPHOCYTES ABSOLUTE: 1.5 K/UL (ref 1–5.1)
LYMPHOCYTES RELATIVE PERCENT: 29.8 %
MCH RBC QN AUTO: 35.4 PG (ref 26–34)
MCHC RBC AUTO-ENTMCNC: 35 G/DL (ref 31–36)
MCV RBC AUTO: 101 FL (ref 80–100)
MICROSCOPIC EXAMINATION: YES
MONOCYTES ABSOLUTE: 0.4 K/UL (ref 0–1.3)
MONOCYTES RELATIVE PERCENT: 8.2 %
MUCUS: ABNORMAL /LPF
NEUTROPHILS ABSOLUTE: 3.1 K/UL (ref 1.7–7.7)
NEUTROPHILS RELATIVE PERCENT: 60.6 %
NITRITE, URINE: NEGATIVE
PDW BLD-RTO: 15.3 % (ref 12.4–15.4)
PH UA: 6 (ref 5–8)
PLATELET # BLD: 163 K/UL (ref 135–450)
PMV BLD AUTO: 7.7 FL (ref 5–10.5)
POTASSIUM REFLEX MAGNESIUM: 4 MMOL/L (ref 3.5–5.1)
PROTEIN UA: 100 MG/DL
RBC # BLD: 3.99 M/UL (ref 4.2–5.9)
RBC UA: ABNORMAL /HPF (ref 0–4)
SODIUM BLD-SCNC: 136 MMOL/L (ref 136–145)
SPECIFIC GRAVITY UA: >=1.03 (ref 1–1.03)
URINE TYPE: ABNORMAL
UROBILINOGEN, URINE: 0.2 E.U./DL
WBC # BLD: 5 K/UL (ref 4–11)
WBC UA: ABNORMAL /HPF (ref 0–5)

## 2021-05-13 PROCEDURE — 36415 COLL VENOUS BLD VENIPUNCTURE: CPT

## 2021-05-13 PROCEDURE — 2580000003 HC RX 258: Performed by: STUDENT IN AN ORGANIZED HEALTH CARE EDUCATION/TRAINING PROGRAM

## 2021-05-13 PROCEDURE — 6360000002 HC RX W HCPCS: Performed by: STUDENT IN AN ORGANIZED HEALTH CARE EDUCATION/TRAINING PROGRAM

## 2021-05-13 PROCEDURE — 96374 THER/PROPH/DIAG INJ IV PUSH: CPT

## 2021-05-13 PROCEDURE — 71045 X-RAY EXAM CHEST 1 VIEW: CPT

## 2021-05-13 PROCEDURE — 99283 EMERGENCY DEPT VISIT LOW MDM: CPT

## 2021-05-13 PROCEDURE — 81001 URINALYSIS AUTO W/SCOPE: CPT

## 2021-05-13 PROCEDURE — 80048 BASIC METABOLIC PNL TOTAL CA: CPT

## 2021-05-13 PROCEDURE — 85025 COMPLETE CBC W/AUTO DIFF WBC: CPT

## 2021-05-13 RX ORDER — SODIUM CHLORIDE, SODIUM LACTATE, POTASSIUM CHLORIDE, CALCIUM CHLORIDE 600; 310; 30; 20 MG/100ML; MG/100ML; MG/100ML; MG/100ML
1000 INJECTION, SOLUTION INTRAVENOUS ONCE
Status: COMPLETED | OUTPATIENT
Start: 2021-05-13 | End: 2021-05-13

## 2021-05-13 RX ORDER — KETOROLAC TROMETHAMINE 30 MG/ML
15 INJECTION, SOLUTION INTRAMUSCULAR; INTRAVENOUS ONCE
Status: COMPLETED | OUTPATIENT
Start: 2021-05-13 | End: 2021-05-13

## 2021-05-13 RX ADMIN — KETOROLAC TROMETHAMINE 15 MG: 30 INJECTION, SOLUTION INTRAMUSCULAR; INTRAVENOUS at 16:22

## 2021-05-13 RX ADMIN — SODIUM CHLORIDE, POTASSIUM CHLORIDE, SODIUM LACTATE AND CALCIUM CHLORIDE 1000 ML: 600; 310; 30; 20 INJECTION, SOLUTION INTRAVENOUS at 17:52

## 2021-05-13 ASSESSMENT — PAIN DESCRIPTION - ORIENTATION: ORIENTATION: LEFT

## 2021-05-13 ASSESSMENT — ENCOUNTER SYMPTOMS
DIARRHEA: 0
SHORTNESS OF BREATH: 0
VOMITING: 0
BACK PAIN: 0
RHINORRHEA: 0
SORE THROAT: 0
ABDOMINAL PAIN: 0
CONSTIPATION: 0
NAUSEA: 0
COUGH: 0

## 2021-05-13 ASSESSMENT — PAIN SCALES - GENERAL: PAINLEVEL_OUTOF10: 8

## 2021-05-13 NOTE — ED PROVIDER NOTES
[address] [facility] RESIDENT NOTE       Date of evaluation: 5/13/2021    Chief Complaint     Flank Pain (left)      History of Present Illness     Ana Rosa Giron is a 80 y.o. male with a history of HTN, HLD, and prostate cancer who presents to the ED for left chest wall tenderness. Patient reports a 2-day history of left-sided chest wall tenderness which has been constant since onset. He describes the pain as sharp and rates it an 8/10 in severity. Pain is worse with movement of the trunk and improved with rest.  He denies any other aggravating or alleviating factors. He denies any other symptoms. No shortness of breath. No nausea, vomiting, or abdominal pain. No urinary symptoms including dysuria, hematuria, frequency, urgency. No flank pain. No diarrhea or constipation. No rhinorrhea, congestion, sore throat, or cough. Patient is otherwise healthy and does not have a history of pulmonary or cardiac abnormalities. Of note, family reports that patient has heavy alcohol use and report concerns that he may have fallen yesterday. Patient denies any new fall or any other trauma over the last 48 hours. Review of Systems     Review of Systems   Constitutional: Negative for chills and fever. HENT: Negative for congestion, rhinorrhea and sore throat. Respiratory: Negative for cough and shortness of breath. Cardiovascular: Positive for chest pain (Left side chest wall tenderness). Negative for leg swelling. Gastrointestinal: Negative for abdominal pain, constipation, diarrhea, nausea and vomiting. Genitourinary: Negative for dysuria, flank pain, hematuria and urgency. Musculoskeletal: Negative for back pain and neck pain. Skin: Negative for rash. Neurological: Negative for seizures, syncope, weakness, light-headedness and headaches. All other systems reviewed and are negative.       Past Medical, Surgical, Family, and Social History     He has a past medical history of Boxer's fracture, Carpal tunnel syndrome on both sides, Disc disease, degenerative, cervical, Hypercholesterolemia, Hypertension, Left humeral fracture, and Prostate cancer ([facility] [address] He has a past surgical history that includes Carpal tunnel release; Cervical disc surgery (08/24/11); Inguinal hernia repair (5/2009); Vasectomy (4/2006); Prostatectomy; Inguinal hernia repair; and Achilles tendon surgery. His family history includes Asthma in his sister; Cancer in his mother; Diabetes in his mother; Heart Disease in his brother; Liver Disease in his sister; No Known Problems in his father, maternal grandfather, maternal grandmother, paternal grandfather, paternal grandmother, sister, sister, and sister. He reports that he quit smoking about 9 years ago. His smoking use included cigarettes. He smoked 0.25 packs per day for 0.00 years. He has never used smokeless tobacco. He reports current alcohol use. He reports that he does not use drugs. Medications     Previous Medications    AMLODIPINE (NORVASC) 5 MG TABLET    Take 1 tablet by mouth daily    ASCORBIC ACID (VITAMIN C) 500 MG TABLET    Take 500 mg by mouth daily    FENOFIBRATE (TRICOR) 145 MG TABLET    Take 1 tablet by mouth daily. FOLIC ACID (FOLVITE) 1 MG TABLET    Take 1 tablet by mouth daily. LIFT CHAIR MISC    by Does not apply route    NAPROXEN (NAPROSYN) 500 MG TABLET    TAKE 1 TABLET BY MOUTH TWICE DAILY WITH A MEAL    SILDENAFIL (VIAGRA) 100 MG TABLET    Take 1 tablet by mouth as needed for Erectile Dysfunction    VALSARTAN-HYDROCHLOROTHIAZIDE (DIOVAN-HCT) 320-12.5 MG PER TABLET    Take 1 tablet by mouth daily. Allergies     He has No Known Allergies. Physical Exam     INITIAL VITALS: BP: (!) 187/109(pt forgot to take bp meds today), Temp: 98.4 °F (36.9 °C), Pulse: 86, Resp: 20, SpO2: 99 %   Physical Exam  Vitals signs and nursing note reviewed. Constitutional:       General: He is not in acute distress. Appearance: Normal appearance. He is not ill-appearing or toxic-appearing. HENT:      Head: Normocephalic and atraumatic. Nose: Nose normal. No congestion or rhinorrhea. Mouth/Throat:      Mouth: Mucous membranes are moist.      Pharynx: Oropharynx is clear. Eyes:      Extraocular Movements: Extraocular movements intact. Conjunctiva/sclera: Conjunctivae normal.      Pupils: Pupils are equal, round, and reactive to light. Neck:      Musculoskeletal: Normal range of motion. Cardiovascular:      Rate and Rhythm: Normal rate and regular rhythm. Heart sounds: No murmur. Pulmonary:      Effort: Pulmonary effort is normal. No respiratory distress. Breath sounds: Normal breath sounds. No wheezing or rales. Comments: Chest wall tenderness over left lower chest wall. No crepitus. Abdominal:      General: Abdomen is flat. There is no distension. Palpations: Abdomen is soft. Tenderness: There is no abdominal tenderness. There is no right CVA tenderness, left CVA tenderness, guarding or rebound. Musculoskeletal: Normal range of motion. Skin:     General: Skin is warm and dry. Capillary Refill: Capillary refill takes less than 2 seconds. Neurological:      General: No focal deficit present. Mental Status: He is alert and oriented to person, place, and time. Cranial Nerves: No cranial nerve deficit. Psychiatric:         Mood and Affect: Mood normal.         Behavior: Behavior normal.         DiagnosticResults     EKG   None.      RADIOLOGY:  XR CHEST PORTABLE   Final Result      No acute pulmonary pathology      Old healed rib fractures on the left                LABS:   Results for orders placed or performed during the hospital encounter of 05/13/21   Urinalysis, reflex to microscopic   Result Value Ref Range    Color, UA Yellow Straw/Yellow    Clarity, UA Clear Clear    Glucose, Ur Negative Negative mg/dL    Bilirubin Urine SMALL (A) Negative    Ketones, Urine TRACE (A) Negative mg/dL    Specific Gravity, UA >=1.030 1.005 - 1.030    Blood, Urine Negative Negative    pH, UA 6.0 5.0 - 8.0    Protein,  (A) Negative mg/dL    Urobilinogen, Urine 0.2 <2.0 E.U./dL    Nitrite, Urine Negative Negative    Leukocyte Esterase, Urine Negative Negative    Microscopic Examination YES     Urine Type Voided    CBC Auto Differential   Result Value Ref Range    WBC 5.0 4.0 - 11.0 K/uL    RBC 3.99 (L) 4.20 - 5.90 M/uL    Hemoglobin 14.1 13.5 - 17.5 g/dL    Hematocrit 40.3 (L) 40.5 - 52.5 %    .0 (H) 80.0 - 100.0 fL    MCH 35.4 (H) 26.0 - 34.0 pg    MCHC 35.0 31.0 - 36.0 g/dL    RDW 15.3 12.4 - 15.4 %    Platelets 228 041 - 647 K/uL    MPV 7.7 5.0 - 10.5 fL    Neutrophils % 60.6 %    Lymphocytes % 29.8 %    Monocytes % 8.2 %    Eosinophils % 0.5 %    Basophils % 0.9 %    Neutrophils Absolute 3.1 1.7 - 7.7 K/uL    Lymphocytes Absolute 1.5 1.0 - 5.1 K/uL    Monocytes Absolute 0.4 0.0 - 1.3 K/uL    Eosinophils Absolute 0.0 0.0 - 0.6 K/uL    Basophils Absolute 0.0 0.0 - 0.2 K/uL   Basic Metabolic Panel w/ Reflex to MG   Result Value Ref Range    Sodium 136 136 - 145 mmol/L    Potassium reflex Magnesium 4.0 3.5 - 5.1 mmol/L    Chloride 99 99 - 110 mmol/L    CO2 25 21 - 32 mmol/L    Anion Gap 12 3 - 16    Glucose 99 70 - 99 mg/dL    BUN 23 (H) 7 - 20 mg/dL    CREATININE 1.4 (H) 0.8 - 1.3 mg/dL    GFR Non-African American 50 (A) >60    GFR African American >60 >60    Calcium 9.3 8.3 - 10.6 mg/dL   Microscopic Urinalysis   Result Value Ref Range    Hyaline Casts, UA 6-10 (A) 0 - 2 /LPF    Mucus, UA 1+ (A) None Seen /LPF    WBC, UA None seen 0 - 5 /HPF    RBC, UA None seen 0 - 4 /HPF       ED BEDSIDE ULTRASOUND:  None. RECENT VITALS:  BP: (!) 187/109(pt forgot to take bp meds today), Temp: 98.4 °F (36.9 °C), Pulse: 86,Resp: 20, SpO2: 99 %     Procedures     None.      ED Course     Nursing Notes, Past Medical Hx, Past Surgical Hx, Social Hx, Allergies, and Family Hx were reviewed. The patient was given the followingmedications:  Orders Placed This Encounter   Medications    ketorolac (TORADOL) injection 15 mg    lactated ringers infusion 1,000 mL       CONSULTS:  None    MEDICAL DECISION MAKING / ASSESSMENT / Arlette Venegas is a 80 y.o. male with a history of HTN, HLD, and prostate cancer who presents to the ED for left chest wall tenderness. Possible report of a fall at home, however patient denies this. Vital signs are notable for hypertension but otherwise within normal limits. Physical exam shows chest wall tenderness over the left side chest.  Physical exam is otherwise reassuring. History is most consistent with a musculoskeletal injury. Also considered infectious and metabolic etiologies given patient's age. X-ray of the chest was obtained and shows no rib fractures or other acute abnormalities. CBC also within normal limits. BMP shows mild bump in creatinine to 1.4. This appears to be prerenal and etiology. Patient was resuscitated with a liter of IV fluids for treatment of this. Patient remained in stable condition throughout his time the emergency department. Pain was treated with Toradol with significant improvement in symptoms. Plan was made to discharge the patient home with outpatient follow-up with his primary care provider. Patient expressed understanding is comfortable with this plan. Patient ambulated from the emergency department in stable condition. This patient was also evaluated by the attending physician. All care plans werediscussed and agreed upon. Clinical Impression     1.  Left-sided chest wall pain        Disposition     PATIENT REFERRED TO:  Zulema Saha MD  [address] [place] [address]  [phone]    Schedule an appointment as soon as possible for a visit in 1 week        DISCHARGE MEDICATIONS:  New Prescriptions    No medications on file       Teto Bran MD  05/13/21 [id number]

## 2021-05-13 NOTE — ED TRIAGE NOTES
Pt c/o left side flank pain does not radiate. Denies n/v/d, denies changes with urination. Does not believe he has done anything to cause trauma. After this rn triaged patient family pulled me to the hallway, states patient is heavy drinker when he has money. Ade Douglass was his birthday and family believes he fell. Pt family also states he has not been taking any of his medications although he will state he has.

## 2021-07-27 RX ORDER — VALSARTAN AND HYDROCHLOROTHIAZIDE 320; 12.5 MG/1; MG/1
TABLET, FILM COATED ORAL
Qty: 90 TABLET | Refills: 1 | Status: SHIPPED | OUTPATIENT
Start: 2021-07-27 | End: 2022-02-18

## 2021-09-13 ENCOUNTER — OFFICE VISIT (OUTPATIENT)
Dept: INTERNAL MEDICINE CLINIC | Age: 71
End: 2021-09-13
Payer: MEDICARE

## 2021-09-13 VITALS
DIASTOLIC BLOOD PRESSURE: 70 MMHG | HEIGHT: 64 IN | BODY MASS INDEX: 19.29 KG/M2 | SYSTOLIC BLOOD PRESSURE: 108 MMHG | WEIGHT: 113 LBS | OXYGEN SATURATION: 97 % | HEART RATE: 65 BPM

## 2021-09-13 DIAGNOSIS — I10 ESSENTIAL HYPERTENSION: ICD-10-CM

## 2021-09-13 DIAGNOSIS — C61 PROSTATE CANCER (HCC): ICD-10-CM

## 2021-09-13 DIAGNOSIS — G56.03 BILATERAL CARPAL TUNNEL SYNDROME: ICD-10-CM

## 2021-09-13 DIAGNOSIS — Z91.81 FALLS INFREQUENTLY: ICD-10-CM

## 2021-09-13 DIAGNOSIS — Z00.00 ROUTINE GENERAL MEDICAL EXAMINATION AT A HEALTH CARE FACILITY: Primary | ICD-10-CM

## 2021-09-13 DIAGNOSIS — G95.20 CERVICAL CORD COMPRESSION WITH MYELOPATHY (HCC): ICD-10-CM

## 2021-09-13 DIAGNOSIS — E78.5 DYSLIPIDEMIA: ICD-10-CM

## 2021-09-13 DIAGNOSIS — M25.522 CHRONIC PAIN OF LEFT ELBOW: ICD-10-CM

## 2021-09-13 DIAGNOSIS — E05.90 HYPERTHYROIDISM: ICD-10-CM

## 2021-09-13 DIAGNOSIS — Z23 NEED FOR PROPHYLACTIC VACCINATION AND INOCULATION AGAINST VARICELLA: ICD-10-CM

## 2021-09-13 DIAGNOSIS — D47.2 MGUS (MONOCLONAL GAMMOPATHY OF UNKNOWN SIGNIFICANCE): ICD-10-CM

## 2021-09-13 DIAGNOSIS — Z23 NEED FOR PROPHYLACTIC VACCINATION AGAINST DIPHTHERIA-TETANUS-PERTUSSIS (DTP): ICD-10-CM

## 2021-09-13 DIAGNOSIS — G89.29 CHRONIC PAIN OF LEFT ELBOW: ICD-10-CM

## 2021-09-13 PROCEDURE — G0438 PPPS, INITIAL VISIT: HCPCS | Performed by: INTERNAL MEDICINE

## 2021-09-13 PROCEDURE — 3017F COLORECTAL CA SCREEN DOC REV: CPT | Performed by: INTERNAL MEDICINE

## 2021-09-13 PROCEDURE — 1123F ACP DISCUSS/DSCN MKR DOCD: CPT | Performed by: INTERNAL MEDICINE

## 2021-09-13 PROCEDURE — 4040F PNEUMOC VAC/ADMIN/RCVD: CPT | Performed by: INTERNAL MEDICINE

## 2021-09-13 RX ORDER — LANOLIN ALCOHOL/MO/W.PET/CERES
1000 CREAM (GRAM) TOPICAL DAILY
COMMUNITY

## 2021-09-13 ASSESSMENT — PATIENT HEALTH QUESTIONNAIRE - PHQ9
SUM OF ALL RESPONSES TO PHQ QUESTIONS 1-9: 0
SUM OF ALL RESPONSES TO PHQ9 QUESTIONS 1 & 2: 0
SUM OF ALL RESPONSES TO PHQ QUESTIONS 1-9: 0
2. FEELING DOWN, DEPRESSED OR HOPELESS: 0
SUM OF ALL RESPONSES TO PHQ QUESTIONS 1-9: 0
1. LITTLE INTEREST OR PLEASURE IN DOING THINGS: 0

## 2021-09-13 ASSESSMENT — LIFESTYLE VARIABLES: HOW OFTEN DO YOU HAVE A DRINK CONTAINING ALCOHOL: 0

## 2021-09-13 NOTE — PROGRESS NOTES
Medicare Annual Wellness Visit  Name: Candice Ornelas Date: 2021   MRN: <[id number]> Sex: Male   Age: 80 y.o. Ethnicity: Non- / Non    : 1950 Race: [facility] is here for Medicare AWV    Screenings for behavioral, psychosocial and functional/safety risks, and cognitive dysfunction are all negative except as indicated below. These results, as well as other patient data from the [address] [facility], are documented in Flowsheets linked to this Encounter. No Known Allergies      Prior to Visit Medications    Medication Sig Taking? Authorizing Provider   vitamin B-12 (CYANOCOBALAMIN) 1000 MCG tablet Take 1,000 mcg by mouth daily Yes Historical Provider, MD   Tdap (ADACEL) 5-2-15.5 LF-MCG/0.5 injection Inject 0.5 mLs into the muscle once for 1 dose Yes Claudean Koh, MD   zoster recombinant adjuvanted vaccine [facility]) 50 MCG/0.5ML SUSR injection Inject 0.5 mLs into the muscle once for 1 dose Yes Claudean Koh, MD   Lift Chair MISC by Does not apply route Yes Claudean Koh, MD   valsartan-hydroCHLOROthiazide (DIOVAN-HCT) 320-12.5 MG per tablet Take 1 tablet by mouth daily. Yes Claudean Koh, MD   sildenafil (VIAGRA) 100 MG tablet Take 1 tablet by mouth as needed for Erectile Dysfunction Yes Claudean Koh, MD   folic acid (FOLVITE) 1 MG tablet Take 1 tablet by mouth daily. Yes Kristi Akhtar MD   fenofibrate (TRICOR) 145 MG tablet Take 1 tablet by mouth daily.  Yes Kristi Akhtar MD   naproxen (NAPROSYN) 500 MG tablet TAKE 1 TABLET BY MOUTH TWICE DAILY WITH A MEAL Yes CE Fernandez MD   amLODIPine (NORVASC) 5 MG tablet Take 1 tablet by mouth daily Yes Kristi Akhtar MD   Ascorbic Acid (VITAMIN C) 500 MG tablet Take 500 mg by mouth daily  Patient not taking: Reported on 2021  Historical Provider, MD         Past Medical History:   Diagnosis Date    Boxer's fracture 2011    right    Carpal tunnel syndrome on both sides     Dr. Alexandra Mills Disc disease, degenerative, cervical     displacement and cervical stenosis    Hypercholesterolemia     Hypertension     Left humeral fracture 2019    Prostate cancer [facility])      prostate       Past Surgical History:   Procedure Laterality Date    ACHILLES TENDON SURGERY      CARPAL TUNNEL RELEASE      Alexandra Mills CERVICAL One Arch Jaswinder SURGERY  08/24/11    Anterior Cervical Discectomy and Fusion C 4-5 with implant    INGUINAL HERNIA REPAIR  5/2009    right, Linda Alegria; left remotely   [facility] Springer      had inguinal surgery twice     PROSTATECTOMY      VASECTOMY  4/2006    Traci Litter         Family History   Problem Relation Age of Onset    Cancer Mother         breast    Diabetes Mother     No Known Problems Father     Asthma Sister     Heart Disease Brother     No Known Problems Sister     No Known Problems Sister     No Known Problems Sister     Liver Disease Sister     No Known Problems Maternal Grandmother     No Known Problems Maternal Grandfather     No Known Problems Paternal Grandmother     No Known Problems Paternal Grandfather        CareTeam (Including outside providers/suppliers regularly involved in providing care):   Patient Care Team:  Ann-Marie Obando MD as PCP - General (Internal Medicine)  Ann-Marie Obando MD as PCP - [facility] Empaneled Provider  Francy Pavon MD as Consulting Physician (Radiation Oncology)    Wt Readings from Last 3 Encounters:   09/13/21 113 lb (51.3 kg)   03/23/21 119 lb 9.6 oz (54.3 kg)   11/16/20 107 lb (48.5 kg)     Vitals:    09/13/21 0801   BP: 108/70   Pulse: 65   SpO2: 97%   Weight: 113 lb (51.3 kg)   Height: 5' 4\" (1.626 m)     Body mass index is 19.4 kg/m². Based upon direct observation of the patient, evaluation of cognition reveals recent and remote memory intact. Patient's complete Health Risk Assessment and screening values have been reviewed and are found in Flowsheets.  The following problems were reviewed today and where indicated follow up appointments were made and/or referrals ordered. Positive Risk Factor Screenings with Interventions:            General Health and ACP:  General  In general, how would you say your health is?: Excellent  In the past 7 days, have you experienced any of the following?  New or Increased Pain, New or Increased Fatigue, Loneliness, Social Isolation, Stress or Anger?: None of These  Do you get the social and emotional support that you need?: Yes  Do you have a Living Will?: (!) No  Advance Directives     Power of [facility] Will ACP-Advance Directive ACP-Power of     Not on File Filed on 07/07/13 Filed Not on File      General Health Risk Interventions:  · No Living Will: Advance Care Planning addressed with patient today        Personalized Preventive Plan   Current Health Maintenance Status  Immunization History   Administered Date(s) Administered    COVID-19, Bach Peter, PF, 30mcg/0.3mL 02/24/2021, 03/17/2021    Influenza Vaccine, unspecified formulation 11/19/2012, 10/10/2013, 10/10/2014    Influenza Virus Vaccine 01/13/2016    Influenza, High Dose (Fluzone 65 yrs and older) 10/05/2017    Influenza, High-dose, Quadv, 65 yrs +, IM (Fluzone) 09/03/2020    Influenza, Quadv, IM, PF (6 mo and older Fluzone, Flulaval, Fluarix, and 3 yrs and older Afluria) 10/08/2018    Influenza, Triv, inactivated, subunit, adjuvanted, IM (Fluad 65 yrs and older) 09/23/2019    Pneumococcal Conjugate 13-valent ([id number]) 05/08/2017    Pneumococcal Polysaccharide ([id number]) 12/19/2016    Td vaccine (adult) 02/09/2007        Health Maintenance   Topic Date Due    Shingles Vaccine (1 of 2) Never done    DTaP/Tdap/Td vaccine (1 - Tdap) 02/10/2007    Annual Wellness Visit (AWV)  Never done    PSA counseling  04/27/2021    TSH testing  07/10/2021    Flu vaccine (1) 09/01/2021    Potassium monitoring  05/13/2022    Creatinine monitoring  05/13/2022    Lipid screen  07/10/2025    Colon cancer screen colonoscopy  08/05/2026    Pneumococcal 65+ years Vaccine  Completed    COVID-19 Vaccine  Completed    AAA screen  Completed    Hepatitis C screen  Completed    Hepatitis A vaccine  Aged Out    Hepatitis B vaccine  Aged Out    Hib vaccine  Aged Out    Meningococcal (ACWY) vaccine  Aged Out     Recommendations for [place] Due: see orders and patient instructions/AVS.  . Recommended screening schedule for the next 5-10 years is provided to the patient in written form: see Patient Instructions/AVS.    Cristela Wharton was seen today for medicare awv. Diagnoses and all orders for this visit:    Routine general medical examination at a health care facility    Dyslipidemia  -     Lipid Panel; Future    Essential hypertension  -     TSH with Reflex; Future  -     COMPREHENSIVE METABOLIC PANEL; Future  -     Lipid Panel; Future  -     CBC Auto Differential; Future    Hyperthyroidism  -     TSH with Reflex; Future    Prostate cancer (HCC)  -     PSA, Prostatic Specific Antigen; Future    Need for prophylactic vaccination against diphtheria-tetanus-pertussis (DTP)  -     Tdap (ADACEL) 5-2-15.5 LF-MCG/0.5 injection; Inject 0.5 mLs into the muscle once for 1 dose    Need for prophylactic vaccination and inoculation against varicella  -     zoster recombinant adjuvanted vaccine [facility]) 50 MCG/0.5ML SUSR injection;  Inject 0.5 mLs into the muscle once for 1 dose    Chronic pain of left elbow  -     Lift Chair MISC; by Does not apply route    Falls infrequently  -     Lift Chair MISC; by Does not apply route    Bilateral carpal tunnel syndrome  -     Lift Chair MISC; by Does not apply route    Cervical cord compression with myelopathy ([facility] [address]  -     Lift Chair MISC; by Does not apply route    MGUS (monoclonal gammopathy of unknown significance)  -     Lift Chair MISC; by Does not apply route

## 2021-09-13 NOTE — PATIENT INSTRUCTIONS
Lift Chair script will be sent to [facility]   Call [facility] to make sure they receive it script    Personalized Preventive Plan for Chani Tom - 9/13/2021  Medicare offers a range of preventive health benefits. Some of the tests and screenings are paid in full while other may be subject to a deductible, co-insurance, and/or copay. Some of these benefits include a comprehensive review of your medical history including lifestyle, illnesses that may run in your family, and various assessments and screenings as appropriate. After reviewing your medical record and screening and assessments performed today your provider may have ordered immunizations, labs, imaging, and/or referrals for you. A list of these orders (if applicable) as well as your Preventive Care list are included within your After Visit Summary for your review. Other Preventive Recommendations:    · A preventive eye exam performed by an eye specialist is recommended every 1-2 years to screen for glaucoma; cataracts, macular degeneration, and other eye disorders. · A preventive dental visit is recommended every 6 months. · Try to get at least 150 minutes of exercise per week or 10,000 steps per day on a pedometer . · Order or download the FREE \"Exercise & Physical Activity: Your Everyday Guide\" from [employer]. Call 7-528.541.7075 or search [employer] online. · You need 6993-2343 mg of calcium and 2335-3461 IU of vitamin D per day. It is possible to meet your calcium requirement with diet alone, but a vitamin D supplement is usually necessary to meet this goal.  · When exposed to the sun, use a sunscreen that protects against both UVA and UVB radiation with an SPF of 30 or greater. Reapply every 2 to 3 hours or after sweating, drying off with a towel, or swimming. · Always wear a seat belt when traveling in a car. Always wear a helmet when riding a bicycle or motorcycle.

## 2021-09-28 ENCOUNTER — OFFICE VISIT (OUTPATIENT)
Dept: INTERNAL MEDICINE CLINIC | Age: 71
End: 2021-09-28
Payer: MEDICARE

## 2021-09-28 VITALS
OXYGEN SATURATION: 99 % | HEIGHT: 64 IN | HEART RATE: 66 BPM | TEMPERATURE: 98.2 F | WEIGHT: 113.1 LBS | BODY MASS INDEX: 19.31 KG/M2 | DIASTOLIC BLOOD PRESSURE: 101 MMHG | SYSTOLIC BLOOD PRESSURE: 155 MMHG

## 2021-09-28 DIAGNOSIS — I10 ESSENTIAL HYPERTENSION: Primary | ICD-10-CM

## 2021-09-28 PROCEDURE — 99213 OFFICE O/P EST LOW 20 MIN: CPT | Performed by: INTERNAL MEDICINE

## 2021-09-28 ASSESSMENT — ENCOUNTER SYMPTOMS
VOMITING: 0
COUGH: 0
CHEST TIGHTNESS: 0
PHOTOPHOBIA: 0
ABDOMINAL PAIN: 0
RHINORRHEA: 0
BACK PAIN: 0
CONSTIPATION: 0
BLOOD IN STOOL: 0
DIARRHEA: 0
SHORTNESS OF BREATH: 0
NAUSEA: 0
ABDOMINAL DISTENTION: 0
COLOR CHANGE: 0

## 2021-09-28 ASSESSMENT — PATIENT HEALTH QUESTIONNAIRE - PHQ9
SUM OF ALL RESPONSES TO PHQ QUESTIONS 1-9: 0
SUM OF ALL RESPONSES TO PHQ9 QUESTIONS 1 & 2: 0
SUM OF ALL RESPONSES TO PHQ QUESTIONS 1-9: 0
1. LITTLE INTEREST OR PLEASURE IN DOING THINGS: 0
SUM OF ALL RESPONSES TO PHQ QUESTIONS 1-9: 0
2. FEELING DOWN, DEPRESSED OR HOPELESS: 0

## 2021-09-28 NOTE — PROGRESS NOTES
Outpatient Clinic New Patient Note    Patient: Jamie Ryan  : 1950 (80 y.o.)  Date: 2021    CC: Establish care visit    HPI:    Mr. James Rodriguez is a 80year old with a hisotyr of Boxer's fracture, bilateral carpal tunnel, degenerative disc disease, hypercholesterolemia, hypertension, left humeral fracture and prostate cancer status-post resection who presents to the clinic to re-establish care. He has no active complaints and states he feels well. Past Medical History:    Past Medical History:   Diagnosis Date    Boxer's fracture 2011    right    Carpal tunnel syndrome on both sides     Dr. Josseline Acosta Disc disease, degenerative, cervical     displacement and cervical stenosis    Hypercholesterolemia     Hypertension     Left humeral fracture 2019    Prostate cancer [facility])      prostate       Past Surgical History:  Past Surgical History:   Procedure Laterality Date    ACHILLES TENDON SURGERY      CARPAL TUNNEL RELEASE      Josseline Acosta CERVICAL One [address] Jaswinder SURGERY  11    Anterior Cervical Discectomy and Fusion C 4-5 with implant    INGUINAL HERNIA REPAIR  2009    right, Gail Amador; left remotely   [facility]      had inguinal surgery twice     PROSTATECTOMY      VASECTOMY  2006    [facility]:  Prior to Visit Medications    Medication Sig Taking? Authorizing Provider   vitamin B-12 (CYANOCOBALAMIN) 1000 MCG tablet Take 1,000 mcg by mouth daily Yes Historical Provider, MD   valsartan-hydroCHLOROthiazide (DIOVAN-HCT) 320-12.5 MG per tablet Take 1 tablet by mouth daily.  Yes Sameer Lucero MD   naproxen (NAPROSYN) 500 MG tablet TAKE 1 TABLET BY MOUTH TWICE DAILY WITH A MEAL Yes CE Brito MD   amLODIPine (NORVASC) 5 MG tablet Take 1 tablet by mouth daily Yes Elizabeth Lake MD   Ascorbic Acid (VITAMIN C) 500 MG tablet Take 500 mg by mouth daily  Yes Historical Provider, MD   [address] by Does not apply route  Sameer Lucero MD   sildenafil (VIAGRA) 100 MG tablet Take 1 tablet by mouth as needed for Erectile Dysfunction  Patient not taking: Reported on 9/28/2021  Lori Matthews MD   folic acid (FOLVITE) 1 MG tablet Take 1 tablet by mouth daily. Patient not taking: Reported on 9/28/2021  CE Tee MD   fenofibrate (TRICOR) 145 MG tablet Take 1 tablet by mouth daily. Patient not taking: Reported on 9/28/2021  CE Tee MD       Allergies:    Patient has no known allergies. Family History:       Problem Relation Age of Onset    Cancer Mother         breast    Diabetes Mother     No Known Problems Father     Asthma Sister     Heart Disease Brother     No Known Problems Sister     No Known Problems Sister     No Known Problems Sister     Liver Disease Sister     No Known Problems Maternal Grandmother     No Known Problems Maternal Grandfather     No Known Problems Paternal Grandmother     No Known Problems Paternal Grandfather        Review of Systems   Constitutional: Negative for appetite change, chills, diaphoresis and fatigue. HENT: Negative for congestion, ear pain, rhinorrhea and tinnitus. Eyes: Negative for photophobia and visual disturbance. Respiratory: Negative for cough, chest tightness and shortness of breath. Cardiovascular: Negative for chest pain, palpitations and leg swelling. Gastrointestinal: Negative for abdominal distention, abdominal pain, blood in stool, constipation, diarrhea, nausea and vomiting. Genitourinary: Negative for difficulty urinating, dysuria and hematuria. Musculoskeletal: Negative for arthralgias, back pain, joint swelling and myalgias. Skin: Negative for color change, pallor and rash. Neurological: Negative for dizziness, facial asymmetry, weakness, light-headedness, numbness and headaches. Psychiatric/Behavioral: Negative for agitation and confusion. The patient is not nervous/anxious.       A 10-organ Review Of Systems was obtained and otherwise unremarkable except as per HPI. Immunization History   Administered Date(s) Administered    COVID-19, Pfizer, PF, 30mcg/0.3mL 02/24/2021, 03/17/2021    Influenza Vaccine, unspecified formulation 11/19/2012, 10/10/2013, 10/10/2014    Influenza Virus Vaccine 01/13/2016    Influenza, High Dose (Fluzone 65 yrs and older) 10/05/2017    Influenza, High-dose, Quadv, 65 yrs +, IM (Fluzone) 09/03/2020    Influenza, Quadv, IM, PF (6 mo and older Fluzone, Flulaval, Fluarix, and 3 yrs and older Afluria) 10/08/2018    Influenza, Triv, inactivated, subunit, adjuvanted, IM (Fluad 65 yrs and older) 09/23/2019    Pneumococcal Conjugate 13-valent ([id number]) 05/08/2017    Pneumococcal Polysaccharide ([id number]) 12/19/2016    Td vaccine (adult) 02/09/2007       Health Maintenance Due   Topic Date Due    Shingles Vaccine (1 of 2) Never done    DTaP/Tdap/Td vaccine (1 - Tdap) 02/10/2007    PSA counseling  04/27/2021    TSH testing  07/10/2021    Flu vaccine (1) 09/01/2021       Data: Old records have been reviewed electronically. PHYSICAL EXAM:  BP (!) 155/101 (Site: Right Upper Arm, Position: Sitting, Cuff Size: Medium Adult)   Pulse 66   Temp 98.2 °F (36.8 °C) (Temporal)   Ht 5' 4\" (1.626 m)   Wt 113 lb 1.6 oz (51.3 kg)   SpO2 99%   BMI 19.41 kg/m²   Physical Exam  Constitutional:       General: He is not in acute distress. Appearance: Normal appearance. He is not ill-appearing or toxic-appearing. HENT:      Head: Normocephalic and atraumatic. Right Ear: External ear normal.      Left Ear: External ear normal.      Nose: Nose normal. No rhinorrhea. Mouth/Throat:      Mouth: Mucous membranes are moist.   Eyes:      General: No scleral icterus. Extraocular Movements: Extraocular movements intact. Pupils: Pupils are equal, round, and reactive to light. Cardiovascular:      Rate and Rhythm: Normal rate and regular rhythm. Pulses: Normal pulses. Heart sounds: Normal heart sounds.  No murmur heard. Pulmonary:      Effort: Pulmonary effort is normal. No respiratory distress. Breath sounds: Normal breath sounds. No stridor. No wheezing, rhonchi or rales. Abdominal:      General: Abdomen is flat. There is no distension. Palpations: Abdomen is soft. Tenderness: There is no abdominal tenderness. There is no guarding or rebound. Musculoskeletal:         General: No swelling. Normal range of motion. Cervical back: Normal range of motion. No rigidity. Lymphadenopathy:      Cervical: No cervical adenopathy. Skin:     General: Skin is warm and dry. Capillary Refill: Capillary refill takes less than 2 seconds. Coloration: Skin is not jaundiced or pale. Findings: No bruising. Neurological:      Mental Status: He is alert and oriented to person, place, and time. Cranial Nerves: No cranial nerve deficit. Sensory: No sensory deficit. Motor: No weakness. Gait: Gait normal.   Psychiatric:         Mood and Affect: Mood normal.         Behavior: Behavior normal.         Thought Content: Thought content normal.         Judgment: Judgment normal.         Assessment & Plan:    Mr. Andrea Desir is a 80year old man who comes to the clinic to establish care. He feels well and has no complaints. H/o prostate cancer s/p prostate resection  - PSA level    Essential hypertension  In the office, blood pressure measurements were 178/106 and 155/101. Per the patient, he has not taken his blood pressure medication yet this morning. Blood pressure trends over office visits have been elevated likely due to medication non-compliance.   - Encourage medication compliance  - Continue with valsartan-HCTZ 320-12.5mg tablets PO daily  - Continue with amlodipine 5mg tablets daily  - TSH level with reflex  - CMP    History of macrocytic anemia  - CBC w/differential  - Continue with home vitamins L-35 and folic acid    Dyslipidemia  - Lipid panel    Chronic left elbow pain  - Lift chair    Falls infrequently  - Lift chair    Bilateral carpal tunnel syndrome  - Lift chair    Cervical cord compression with myelopathy  - Lift chair    Monocloncal gammopathy of unknown significance  - CBC w/differential  - Lift chair    Dispo: Pt has been staffed with Dr. Michael Sebastian MD.  _______________  Ebony Guerrero MD, 9/28/2021 9:05 AM   PGY-1

## 2021-09-28 NOTE — PATIENT INSTRUCTIONS
Thank you for seeing us today! As we had discussed, the purpose of your visit today was to establish care with us. We would like you to have the following labs done at [facility] [place] [address] lab:    CBC w/differential  TSH w/reflex  CMP  PSA  Lipid panel      As we had discussed, your blood pressure was high during this visit on two measurements, and this may have been due to the fact that you hadn't taken your blood pressure medications. Please remember to take these medications regularly. We would like to see you again in 6 months or sooner if there is a change in your health.

## 2022-02-15 ENCOUNTER — OFFICE VISIT (OUTPATIENT)
Dept: INTERNAL MEDICINE CLINIC | Age: 72
End: 2022-02-15
Payer: MEDICARE

## 2022-02-15 VITALS
TEMPERATURE: 97.3 F | WEIGHT: 115.9 LBS | DIASTOLIC BLOOD PRESSURE: 101 MMHG | RESPIRATION RATE: 18 BRPM | HEART RATE: 66 BPM | OXYGEN SATURATION: 96 % | BODY MASS INDEX: 19.89 KG/M2 | SYSTOLIC BLOOD PRESSURE: 175 MMHG

## 2022-02-15 DIAGNOSIS — C61 PROSTATE CANCER (HCC): ICD-10-CM

## 2022-02-15 DIAGNOSIS — E78.5 DYSLIPIDEMIA: ICD-10-CM

## 2022-02-15 DIAGNOSIS — I10 ESSENTIAL HYPERTENSION: Primary | ICD-10-CM

## 2022-02-15 DIAGNOSIS — D70.9 NEUTROPENIA, UNSPECIFIED TYPE (HCC): ICD-10-CM

## 2022-02-15 DIAGNOSIS — G95.20 CERVICAL CORD COMPRESSION WITH MYELOPATHY (HCC): ICD-10-CM

## 2022-02-15 PROCEDURE — 99213 OFFICE O/P EST LOW 20 MIN: CPT | Performed by: STUDENT IN AN ORGANIZED HEALTH CARE EDUCATION/TRAINING PROGRAM

## 2022-02-15 RX ORDER — BLOOD PRESSURE TEST KIT-MEDIUM
1 KIT MISCELLANEOUS 2 TIMES DAILY
Qty: 1 EACH | Refills: 0 | Status: SHIPPED | OUTPATIENT
Start: 2022-02-15

## 2022-02-15 RX ORDER — ROSUVASTATIN CALCIUM 10 MG/1
10 TABLET, COATED ORAL NIGHTLY
Qty: 30 TABLET | Refills: 3 | Status: SHIPPED | OUTPATIENT
Start: 2022-02-15 | End: 2022-03-08

## 2022-02-15 ASSESSMENT — ENCOUNTER SYMPTOMS
SHORTNESS OF BREATH: 0
VOICE CHANGE: 0
CHOKING: 0
VOMITING: 0
DIARRHEA: 0
STRIDOR: 0
CONSTIPATION: 0
CHEST TIGHTNESS: 0
NAUSEA: 0
COUGH: 0
WHEEZING: 0
PHOTOPHOBIA: 0
TROUBLE SWALLOWING: 0
ABDOMINAL DISTENTION: 0
APNEA: 0
ABDOMINAL PAIN: 0

## 2022-02-15 NOTE — PROGRESS NOTES
2/15/2022    Marce Ramos (:  1950) is a 80 y.o. male, here for a follow up visit. No complaints today     HTN   Readings are high. Today at the office readings around 170/100s. Patient reports that he has not yet taking meds. Pt takes BP meds altogether at 9AM every morning. Each visit patient was here before taking his meds and readings have been high. Denies any HA, N/V, CP, SOB. History of Prostate CA  S/p surgery. Doing well no urinary complaints. Last PSA 0.10 in 2020. Patient Active Problem List   Diagnosis    Cervical cord compression with myelopathy (HCC)    Anemia, macrocytic    ETOH abuse    Cataract    Weight loss    H/O prostate cancer    Hyperthyroidism    Graves disease    MGUS (monoclonal gammopathy of unknown significance)    Abnormal ultrasound of kidney    Essential hypertension    Bilateral carpal tunnel syndrome    Chronic pain of left elbow    Embedded earring of left ear    Falls infrequently    Neutropenia, unspecified type ([address]    Prostate cancer ([address]       Review of Systems   Constitutional: Negative for activity change, appetite change, chills, diaphoresis, fatigue, fever and unexpected weight change. HENT: Negative for trouble swallowing and voice change. Eyes: Negative for photophobia and visual disturbance. Respiratory: Negative for apnea, cough, choking, chest tightness, shortness of breath, wheezing and stridor. Cardiovascular: Negative for chest pain, palpitations and leg swelling. Gastrointestinal: Negative for abdominal distention, abdominal pain, constipation, diarrhea, nausea and vomiting. Genitourinary: Negative for difficulty urinating and dysuria. Skin: Negative for rash and wound. Neurological: Negative for dizziness, weakness and light-headedness. Psychiatric/Behavioral: Negative for agitation and behavioral problems. Prior to Visit Medications    Medication Sig Taking?  Authorizing Provider   Blood Pressure Monitoring (BLOOD PRESS MONITOR/M-L CUFF) MISC 1 applicator by Does not apply route 2 times daily Yes Deneen Jimenez MD   rosuvastatin (CRESTOR) 10 MG tablet Take 1 tablet by mouth nightly Yes Deneen Jimenez MD   vitamin B-12 (CYANOCOBALAMIN) 1000 MCG tablet Take 1,000 mcg by mouth daily Yes Historical Provider, MD   Lift Chair [address] [facility] by Does not apply route Yes Derrek Marina MD   valsartan-hydroCHLOROthiazide (DIOVAN-HCT) 320-12.5 MG per tablet Take 1 tablet by mouth daily. Yes Derrek Marina MD   sildenafil (VIAGRA) 100 MG tablet Take 1 tablet by mouth as needed for Erectile Dysfunction Yes Derrek Marina MD   folic acid (FOLVITE) 1 MG tablet Take 1 tablet by mouth daily. Yes Rosendo Rios MD   naproxen (NAPROSYN) 500 MG tablet TAKE 1 TABLET BY MOUTH TWICE DAILY WITH A MEAL Yes CE Franco MD   amLODIPine (NORVASC) 5 MG tablet Take 1 tablet by mouth daily Yes Rosendo Rios MD   Ascorbic Acid (VITAMIN C) 500 MG tablet Take 500 mg by mouth daily  Yes Historical Provider, MD   fenofibrate (TRICOR) 145 MG tablet Take 1 tablet by mouth daily.   Patient not taking: Reported on 9/28/2021  Rosendo Rios MD        No Known Allergies    Past Medical History:   Diagnosis Date    Boxer's fracture 7/2011    right    Carpal tunnel syndrome on both sides     Dr. Karl Campos Disc disease, degenerative, cervical     displacement and cervical stenosis    Hypercholesterolemia     Hypertension     Left humeral fracture 2019    Prostate cancer [facility])      prostate       Past Surgical History:   Procedure Laterality Date    ACHILLES TENDON SURGERY      CARPAL TUNNEL RELEASE      Karl Campos CERVICAL [address] SURGERY  08/24/11    Anterior Cervical Discectomy and Fusion C 4-5 with implant    INGUINAL HERNIA REPAIR  5/2009    right, Viviana Form; left remotely   [place]      had inguinal surgery twice     PROSTATECTOMY      VASECTOMY  4/2006    Miguel Ángel Jerry       Social History     Socioeconomic History    Marital status:      Spouse name: Not on file    Number of children: 3    Years of education: Not on file    Highest education level: Not on file   Occupational History    Occupation: Retired      Comment: [employer]    Tobacco Use    Smoking status: Former Smoker     Packs/day: 0.25     Years: 0.00     Pack years: 0.00     Types: Cigarettes     Quit date: 1/1/2012     Years since quitting: 10.1    Smokeless tobacco: Never Used   Substance and Sexual Activity    Alcohol use: Yes     Comment: daily when he has fund to do so   Rigoberto Romero Drug use: No    Sexual activity: Yes     Partners: Female   Other Topics Concern    Not on file   Social History Narrative    Lives alone. He has three daughters- two are local, one lives in [place]. Close to his children- talks to them daily- June 1, 2020      Social Determinants of Health     Financial Resource Strain: Low Risk     Difficulty of Paying Living Expenses: Not hard at all   Food Insecurity: No Food Insecurity    Worried About Running Out of Food in the Last Year: Never true    [address] in the Last Year: Never true   Transportation Needs: No Transportation Needs    Lack of Transportation (Medical): No    Lack of Transportation (Non-Medical):  No   Physical Activity:     Days of Exercise per Week: Not on file    Minutes of Exercise per Session: Not on file   Stress:     Feeling of Stress : Not on file   Social Connections:     Frequency of Communication with Friends and Family: Not on file    Frequency of Social Gatherings with Friends and Family: Not on file    Attends [employer] Services: Not on file    Active Member of Clubs or Organizations: Not on file    Attends Club or Organization Meetings: Not on file    Marital Status: Not on file   Intimate Partner Violence:     Fear of Current or Ex-Partner: Not on file    Emotionally Abused: Not on file    Physically Abused: Not on file    Sexually Abused: Not on file   Housing Stability:     Unable to Pay for Housing in the Last Year: Not on file    Number of Places Lived in the Last Year: Not on file    Unstable Housing in the Last Year: Not on file        Family History   Problem Relation Age of Onset    Cancer Mother         breast    Diabetes Mother     No Known Problems Father     Asthma Sister     Heart Disease Brother     No Known Problems Sister     No Known Problems Sister     No Known Problems Sister     Liver Disease Sister     No Known Problems Maternal Grandmother     No Known Problems Maternal Grandfather     No Known Problems Paternal Grandmother     No Known Problems Paternal Grandfather        ADVANCE DIRECTIVE: N, <no information>    Vitals:    02/15/22 0852 02/15/22 0853   BP: (!) 177/97 (!) 175/101   Pulse: 66    Resp: 18    Temp: 97.3 °F (36.3 °C)    TempSrc: Temporal    SpO2: 96%    Weight: 115 lb 14.4 oz (52.6 kg)      Estimated body mass index is 19.89 kg/m² as calculated from the following:    Height as of 9/28/21: 5' 4\" (1.626 m). Weight as of this encounter: 115 lb 14.4 oz (52.6 kg). Physical Exam  Vitals and nursing note reviewed. Constitutional:       General: He is not in acute distress. Appearance: Normal appearance. He is well-developed. He is not diaphoretic. HENT:      Head: Normocephalic and atraumatic. Eyes:      General: No scleral icterus. Conjunctiva/sclera: Conjunctivae normal.      Pupils: Pupils are equal, round, and reactive to light. Cardiovascular:      Rate and Rhythm: Normal rate and regular rhythm. Heart sounds: Normal heart sounds. No murmur heard. No friction rub. No gallop. Pulmonary:      Effort: Pulmonary effort is normal. No respiratory distress. Breath sounds: Normal breath sounds. No wheezing or rales. Chest:      Chest wall: No tenderness. Abdominal:      General: Bowel sounds are normal. There is no distension. Palpations: Abdomen is soft.    Musculoskeletal: General: No tenderness or deformity. Normal range of motion. Cervical back: Normal range of motion and neck supple. Skin:     General: Skin is warm and dry. Neurological:      Mental Status: He is alert and oriented to person, place, and time. Cranial Nerves: No cranial nerve deficit. Sensory: No sensory deficit.    Psychiatric:         Behavior: Behavior normal.           Lab Results   Component Value Date    CHOL 214 07/10/2020    CHOL 256 03/26/2019    CHOL 224 04/11/2018    TRIG 152 07/10/2020    TRIG 123 03/26/2019    TRIG 1,016 04/11/2018    HDL 89 07/10/2020    HDL 82 03/26/2019    HDL 37 04/11/2018    [facility] 95 07/10/2020    LDLCALC 149 03/26/2019    LDLCALC see below 04/11/2018    GLUCOSE 99 05/13/2021    LABA1C 5.0 07/10/2020       The 10-year ASCVD risk score (Zi Roberts, et al., 2013) is: 28%    Values used to calculate the score:      Age: 80 years      Sex: Male      Is Non- : Yes      Diabetic: No      Tobacco smoker: No      Systolic Blood Pressure: 471 mmHg      Is BP treated: Yes      HDL Cholesterol: 89 mg/dL      Total Cholesterol: 214 mg/dL    Immunization History   Administered Date(s) Administered    COVID-19, Pfizer Purple top, DILUTE for use, 12+ yrs, 30mcg/0.3mL dose 02/24/2021, 03/17/2021    Influenza Vaccine, unspecified formulation 11/19/2012, 10/10/2013, 10/10/2014    Influenza Virus Vaccine 01/13/2016    Influenza, High Dose (Fluzone 65 yrs and older) 10/05/2017    Influenza, High-dose, Quadv, 65 yrs +, IM (Fluzone) 09/03/2020    Influenza, Quadv, IM, PF (6 mo and older Fluzone, Flulaval, Fluarix, and 3 yrs and older Afluria) 10/08/2018    Influenza, Triv, inactivated, subunit, adjuvanted, IM (Fluad 65 yrs and older) 09/23/2019    Pneumococcal Conjugate 13-valent ([id number]) 05/08/2017    Pneumococcal Polysaccharide ([id number]) 12/19/2016    Td vaccine (adult) 02/09/2007       Health Maintenance   Topic Date Due    Shingles Vaccine (1 of 2) Never done    DTaP/Tdap/Td vaccine (1 - Tdap) 02/10/2007    PSA counseling  04/27/2021    TSH testing  07/10/2021    COVID-19 Vaccine (3 - Booster for Pfizer series) 08/17/2021    Potassium monitoring  05/13/2022    Creatinine monitoring  05/13/2022    Annual Wellness Visit (AWV)  09/14/2022    Depression Screen  09/28/2022    Lipid screen  07/10/2025    Colon cancer screen colonoscopy  08/05/2026    Flu vaccine  Completed    Pneumococcal 65+ years Vaccine  Completed    AAA screen  Completed    Hepatitis C screen  Completed    Hepatitis A vaccine  Aged Out    Hepatitis B vaccine  Aged Out    Hib vaccine  Aged Out    Meningococcal (ACWY) vaccine  Aged Out          ASSESSMENT/PLAN:  1. Essential hypertension  Not at goal. BP has been high 170s/100s in office ready.   - Patient will  BP cuff and start recording BP readings at home   - Recommend to bring BP cuff and BP readings at next visit   - Might need meds adjustment at the next visit depends on home readings  - Lab work   -     CBC WITH AUTO DIFFERENTIAL; Future  -     BASIC METABOLIC PANEL; Future  -     LIPID PANEL; Future    2. Dyslipidemia  Was not on Statin before   On Fibrate for Triglyceride   - Discussed with patient to start crestor today. Discussed possible side effects   - Will monitor for side effects   - Repeat labs in a few week including Lipid panel   -     HEMOGLOBIN A1C; Future    3. Neutropenia, unspecified type ([address]  Obtain labs today   - No fever no complaints    4. Prostate cancer [facility])  Had surgery before, in remission x 7-8 years  - Will monitor PSA   - No  related complaints   -     Psa screening; Future    5. Cervical cord compression with myelopathy ([address]  Doing well no complaints   Continue supportive care     Return in about 2 weeks (around 3/1/2022) for Follow up - BP . An electronic signature was used to authenticate this note.     --Elizabeth Wilson MD on 2/15/2022 at 9:46 AM

## 2022-02-18 RX ORDER — VALSARTAN AND HYDROCHLOROTHIAZIDE 320; 12.5 MG/1; MG/1
TABLET, FILM COATED ORAL
Qty: 90 TABLET | Refills: 1 | Status: SHIPPED | OUTPATIENT
Start: 2022-02-18 | End: 2022-03-02 | Stop reason: SINTOL

## 2022-03-01 DIAGNOSIS — C61 PROSTATE CANCER (HCC): ICD-10-CM

## 2022-03-01 DIAGNOSIS — N18.32 STAGE 3B CHRONIC KIDNEY DISEASE (HCC): Primary | ICD-10-CM

## 2022-03-01 DIAGNOSIS — E78.5 DYSLIPIDEMIA: ICD-10-CM

## 2022-03-01 DIAGNOSIS — I10 ESSENTIAL HYPERTENSION: ICD-10-CM

## 2022-03-01 LAB
ANION GAP SERPL CALCULATED.3IONS-SCNC: 19 MMOL/L (ref 3–16)
BASOPHILS ABSOLUTE: 0 K/UL (ref 0–0.2)
BASOPHILS RELATIVE PERCENT: 0.5 %
BUN BLDV-MCNC: 64 MG/DL (ref 7–20)
CALCIUM SERPL-MCNC: 10.5 MG/DL (ref 8.3–10.6)
CHLORIDE BLD-SCNC: 95 MMOL/L (ref 99–110)
CHOLESTEROL, TOTAL: 308 MG/DL (ref 0–199)
CO2: 24 MMOL/L (ref 21–32)
CREAT SERPL-MCNC: 2.6 MG/DL (ref 0.8–1.3)
EOSINOPHILS ABSOLUTE: 0.1 K/UL (ref 0–0.6)
EOSINOPHILS RELATIVE PERCENT: 1.1 %
GFR AFRICAN AMERICAN: 30
GFR NON-AFRICAN AMERICAN: 24
GLUCOSE BLD-MCNC: 104 MG/DL (ref 70–99)
HCT VFR BLD CALC: 47.3 % (ref 40.5–52.5)
HDLC SERPL-MCNC: 74 MG/DL (ref 40–60)
HEMOGLOBIN: 15.7 G/DL (ref 13.5–17.5)
LDL CHOLESTEROL CALCULATED: 204 MG/DL
LYMPHOCYTES ABSOLUTE: 2.5 K/UL (ref 1–5.1)
LYMPHOCYTES RELATIVE PERCENT: 46.5 %
MCH RBC QN AUTO: 33.5 PG (ref 26–34)
MCHC RBC AUTO-ENTMCNC: 33.2 G/DL (ref 31–36)
MCV RBC AUTO: 100.7 FL (ref 80–100)
MONOCYTES ABSOLUTE: 0.4 K/UL (ref 0–1.3)
MONOCYTES RELATIVE PERCENT: 7.5 %
NEUTROPHILS ABSOLUTE: 2.4 K/UL (ref 1.7–7.7)
NEUTROPHILS RELATIVE PERCENT: 44.4 %
PDW BLD-RTO: 14.6 % (ref 12.4–15.4)
PLATELET # BLD: 224 K/UL (ref 135–450)
PMV BLD AUTO: 7.7 FL (ref 5–10.5)
POTASSIUM SERPL-SCNC: 4.9 MMOL/L (ref 3.5–5.1)
PROSTATE SPECIFIC ANTIGEN: 0.07 NG/ML (ref 0–4)
RBC # BLD: 4.7 M/UL (ref 4.2–5.9)
SODIUM BLD-SCNC: 138 MMOL/L (ref 136–145)
TRIGL SERPL-MCNC: 151 MG/DL (ref 0–150)
VLDLC SERPL CALC-MCNC: 30 MG/DL
WBC # BLD: 5.4 K/UL (ref 4–11)

## 2022-03-02 ENCOUNTER — TELEPHONE (OUTPATIENT)
Dept: INTERNAL MEDICINE CLINIC | Age: 72
End: 2022-03-02

## 2022-03-02 DIAGNOSIS — I10 ESSENTIAL HYPERTENSION: ICD-10-CM

## 2022-03-02 LAB
ESTIMATED AVERAGE GLUCOSE: 119.8 MG/DL
HBA1C MFR BLD: 5.8 %

## 2022-03-02 RX ORDER — NIFEDIPINE 30 MG/1
30 TABLET, EXTENDED RELEASE ORAL DAILY
Qty: 30 TABLET | Refills: 0 | Status: SHIPPED | OUTPATIENT
Start: 2022-03-02

## 2022-03-02 RX ORDER — NIFEDIPINE 30 MG/1
30 TABLET, EXTENDED RELEASE ORAL DAILY
Qty: 30 TABLET | Refills: 0 | Status: SHIPPED | OUTPATIENT
Start: 2022-03-02 | End: 2022-03-02 | Stop reason: SDUPTHER

## 2022-03-02 NOTE — PROGRESS NOTES
Reviewed lab results with patient and explained his cr is 1.4 > 2.6   He has had uncontrolled htn sbp 170s-180s for about a year now. He is taking valsartan-HCTZ  His GFR is 30  Will hold valsartan-HCTZ for now, start on nifedipine XL 30 mg daily. Asked patient to check bp at least 3 times a day and call us if sbp persistently above 170  Asked patient to start taking this medication tomorrow and call Dr. Ruthie Clark office to make an appointment as soon as possible.   Will order SPEP, UPEP, kappa/lambda ratio    Electronically signed by Sami Harris MD on 3/2/2022 at 1:50 PM  PGY-3  Internal Medicine

## 2022-03-02 NOTE — TELEPHONE ENCOUNTER
90 day fill for Nifedipine denied by Sylvain Marte.  Patient to take Nifedipine only for 30 days until he is seen by nephrologist

## 2022-03-03 ENCOUNTER — TELEPHONE (OUTPATIENT)
Dept: INTERNAL MEDICINE CLINIC | Age: 72
End: 2022-03-03

## 2022-03-03 NOTE — TELEPHONE ENCOUNTER
Patient states he will call 's office today for appointment. Labs faxed to 's office. Patient needs to be seen in nephrology soon,.

## 2022-03-08 ENCOUNTER — OFFICE VISIT (OUTPATIENT)
Dept: INTERNAL MEDICINE CLINIC | Age: 72
End: 2022-03-08
Payer: MEDICARE

## 2022-03-08 VITALS
TEMPERATURE: 98 F | SYSTOLIC BLOOD PRESSURE: 115 MMHG | DIASTOLIC BLOOD PRESSURE: 81 MMHG | RESPIRATION RATE: 18 BRPM | BODY MASS INDEX: 19.19 KG/M2 | HEART RATE: 68 BPM | WEIGHT: 111.8 LBS | OXYGEN SATURATION: 98 %

## 2022-03-08 DIAGNOSIS — N18.4 CHRONIC KIDNEY DISEASE, STAGE IV (SEVERE) (HCC): Primary | ICD-10-CM

## 2022-03-08 DIAGNOSIS — E78.2 MIXED HYPERLIPIDEMIA: ICD-10-CM

## 2022-03-08 DIAGNOSIS — I10 ESSENTIAL HYPERTENSION: ICD-10-CM

## 2022-03-08 PROCEDURE — 99213 OFFICE O/P EST LOW 20 MIN: CPT | Performed by: STUDENT IN AN ORGANIZED HEALTH CARE EDUCATION/TRAINING PROGRAM

## 2022-03-08 RX ORDER — ROSUVASTATIN CALCIUM 20 MG/1
20 TABLET, COATED ORAL NIGHTLY
Qty: 30 TABLET | Refills: 3 | Status: SHIPPED | OUTPATIENT
Start: 2022-03-08 | End: 2022-05-18 | Stop reason: SDUPTHER

## 2022-03-08 ASSESSMENT — ENCOUNTER SYMPTOMS
PHOTOPHOBIA: 0
APNEA: 0
STRIDOR: 0
ABDOMINAL DISTENTION: 0
DIARRHEA: 0
TROUBLE SWALLOWING: 0
SHORTNESS OF BREATH: 0
CONSTIPATION: 0
CHOKING: 0
COUGH: 0
VOMITING: 0
WHEEZING: 0
ABDOMINAL PAIN: 0
VOICE CHANGE: 0
NAUSEA: 0
CHEST TIGHTNESS: 0

## 2022-03-08 NOTE — PATIENT INSTRUCTIONS
Please continue to take your medications as prescribed. Obtain a new blood pressure cuff and check your BP at home. Go to your appointment with Nephro (Dr. Franklyn Landeros) on 3/18/2022.   Return to our clinic for your follow up appointment in 4 weeks after being seen by Nephrologist.

## 2022-03-08 NOTE — PROGRESS NOTES
3/8/2022    Cristina Morris (:  1950) is a 80 y.o. male, here for a follow up visit for BP check and abnormal lab work. HTN   Reading in the office today is 115/81. He did take his meds this AM.   Brought in his new BP cuff but machine is not working. He will exchange for a new one. Was instructed to continue to monitor his BP at home and bring the new BP machine with him to his f/u visit. CKD 4  Recent lab work on 3/1/22 showed significant elevated Cr to 2.6 from 1.4 prior in 2021. BUN/cr ratio reflecting possible prerenal. Patient denies any change in PO intake. No new OTC supplement. Recently started on Crestor on 3/1/2022. No urinary or  related symptoms. Lab results were discussed over the phone with patient by one of the residents. Patient has an appointment to see Dr. Keven Gabriel (nephro) on 3/18. Patient Active Problem List   Diagnosis    Cervical cord compression with myelopathy (HCC)    Anemia, macrocytic    ETOH abuse    Cataract    Weight loss    H/O prostate cancer    Hyperthyroidism    Graves disease    MGUS (monoclonal gammopathy of unknown significance)    Abnormal ultrasound of kidney    Essential hypertension    Bilateral carpal tunnel syndrome    Chronic pain of left elbow    Embedded earring of left ear    Falls infrequently    Neutropenia, unspecified type ([address]    Prostate cancer ([address]    Chronic kidney disease, stage IV (severe) ([address]       Review of Systems   Constitutional: Negative for activity change, appetite change, chills, diaphoresis, fatigue, fever and unexpected weight change. HENT: Negative for trouble swallowing and voice change. Eyes: Negative for photophobia and visual disturbance. Respiratory: Negative for apnea, cough, choking, chest tightness, shortness of breath, wheezing and stridor. Cardiovascular: Negative for chest pain, palpitations and leg swelling.    Gastrointestinal: Negative for abdominal distention, abdominal pain, constipation, diarrhea, nausea and vomiting. Genitourinary: Negative for difficulty urinating and dysuria. Skin: Negative for rash and wound. Neurological: Negative for dizziness, weakness and light-headedness. Psychiatric/Behavioral: Negative for agitation and behavioral problems. Prior to Visit Medications    Medication Sig Taking? Authorizing Provider   rosuvastatin (CRESTOR) 20 MG tablet Take 1 tablet by mouth at bedtime Yes Mery Martinez MD   NIFEdipine (PROCARDIA XL) 30 MG extended release tablet Take 1 tablet by mouth daily Yes Yovani Ascencio MD   Blood Pressure Monitoring (BLOOD PRESS MONITOR/M-L CUFF) MISC 1 applicator by Does not apply route 2 times daily Yes Mery Martinez MD   vitamin B-12 (CYANOCOBALAMIN) 1000 MCG tablet Take 1,000 mcg by mouth daily Yes Historical Provider, MD   Lift Chair MISC by Does not apply route Yes Humza Johnson MD   sildenafil (VIAGRA) 100 MG tablet Take 1 tablet by mouth as needed for Erectile Dysfunction Yes Humza Johnson MD   folic acid (FOLVITE) 1 MG tablet Take 1 tablet by mouth daily. Yes Paula Gaytan MD   fenofibrate (TRICOR) 145 MG tablet Take 1 tablet by mouth daily.  Yes Paula Gaytan MD   Ascorbic Acid (VITAMIN C) 500 MG tablet Take 500 mg by mouth daily  Yes Historical Provider, MD        No Known Allergies    Past Medical History:   Diagnosis Date    Boxer's fracture 7/2011    right    Carpal tunnel syndrome on both sides     Dr. Sangita Lewis Disc disease, degenerative, cervical     displacement and cervical stenosis    Hypercholesterolemia     Hypertension     Left humeral fracture 2019    Prostate cancer [facility])      prostate       Past Surgical History:   Procedure Laterality Date    ACHILLES TENDON SURGERY      CARPAL TUNNEL RELEASE      Sangita Lewis CERVICAL [address] SURGERY  08/24/11    Anterior Cervical Discectomy and Fusion C 4-5 with implant    INGUINAL HERNIA REPAIR  5/2009    right, Marielle Mc; left [facility]   [employer] HERNIA REPAIR      had inguinal surgery twice     PROSTATECTOMY      VASECTOMY  4/2006    Rubén Cristobal       Social History     Socioeconomic History    Marital status:      Spouse name: Not on file    Number of children: 3    Years of education: Not on file    Highest education level: Not on file   Occupational History    Occupation: Retired      Comment: [employer]    Tobacco Use    Smoking status: Former Smoker     Packs/day: 0.25     Years: 0.00     Pack years: 0.00     Types: Cigarettes     Quit date: 1/1/2012     Years since quitting: 10.1    Smokeless tobacco: Never Used   Substance and Sexual Activity    Alcohol use: Yes     Comment: daily when he has fund to do so   Wills Drug use: No    Sexual activity: Yes     Partners: Female   Other Topics Concern    Not on file   Social History Narrative    Lives alone. He has three daughters- two are local, one lives in [place]. Close to his children- talks to them daily- June 1, 2020      Social Determinants of Health     Financial Resource Strain: Low Risk     Difficulty of Paying Living Expenses: Not hard at all   Food Insecurity: No Food Insecurity    Worried About Running Out of Food in the Last Year: Never true    [address] in the Last Year: Never true   Transportation Needs: No Transportation Needs    Lack of Transportation (Medical): No    Lack of Transportation (Non-Medical):  No   Physical Activity:     Days of Exercise per Week: Not on file    Minutes of Exercise per Session: Not on file   Stress:     Feeling of Stress : Not on file   Social Connections:     Frequency of Communication with Friends and Family: Not on file    Frequency of Social Gatherings with Friends and Family: Not on file    Attends Restoration Services: Not on file    Active Member of Clubs or Organizations: Not on file    Attends Club or Organization Meetings: Not on file    Marital Status: Not on file   Intimate Partner Violence:     Fear of Current or Ex-Partner: Not on file    Emotionally Abused: Not on file    Physically Abused: Not on file    Sexually Abused: Not on file   Housing Stability:     Unable to Pay for Housing in the Last Year: Not on file    Number of Places Lived in the Last Year: Not on file    Unstable Housing in the Last Year: Not on file        Family History   Problem Relation Age of Onset    Cancer Mother         breast    Diabetes Mother     No Known Problems Father     Asthma Sister     Heart Disease Brother     No Known Problems Sister     No Known Problems Sister     No Known Problems Sister     Liver Disease Sister     No Known Problems Maternal Grandmother     No Known Problems Maternal Grandfather     No Known Problems Paternal Grandmother     No Known Problems Paternal Grandfather        ADVANCE DIRECTIVE: N, <no information>    Vitals:    03/08/22 1314   BP: 115/81   Pulse: 68   Resp: 18   Temp: 98 °F (36.7 °C)   TempSrc: Temporal   SpO2: 98%   Weight: 111 lb 12.8 oz (50.7 kg)     Estimated body mass index is 19.19 kg/m² as calculated from the following:    Height as of 9/28/21: 5' 4\" (1.626 m). Weight as of this encounter: 111 lb 12.8 oz (50.7 kg). Physical Exam  Vitals and nursing note reviewed. Constitutional:       General: He is not in acute distress. Appearance: Normal appearance. He is well-developed and normal weight. He is not diaphoretic. HENT:      Head: Normocephalic and atraumatic. Eyes:      General: No scleral icterus. Conjunctiva/sclera: Conjunctivae normal.      Pupils: Pupils are equal, round, and reactive to light. Cardiovascular:      Rate and Rhythm: Normal rate and regular rhythm. Pulses: Normal pulses. Heart sounds: Normal heart sounds. No murmur heard. No friction rub. No gallop. Pulmonary:      Effort: Pulmonary effort is normal. No respiratory distress. Breath sounds: Normal breath sounds. No wheezing or rales.    Chest:      Chest wall: No tenderness. Abdominal:      General: Bowel sounds are normal. There is no distension. Palpations: Abdomen is soft. Tenderness: There is no abdominal tenderness. Musculoskeletal:         General: No swelling, tenderness or deformity. Normal range of motion. Cervical back: Normal range of motion and neck supple. Skin:     General: Skin is warm and dry. Neurological:      General: No focal deficit present. Mental Status: He is alert and oriented to person, place, and time. Cranial Nerves: No cranial nerve deficit. Sensory: No sensory deficit.    Psychiatric:         Behavior: Behavior normal.           Lab Results   Component Value Date    CHOL 308 03/01/2022    CHOL 214 07/10/2020    CHOL 256 03/26/2019    TRIG 151 03/01/2022    TRIG 152 07/10/2020    TRIG 123 03/26/2019    HDL 74 03/01/2022    HDL 89 07/10/2020    HDL 82 03/26/2019    LDLCALC 204 03/01/2022    LDLCALC 95 07/10/2020    LDLCALC 149 03/26/2019    GLUCOSE 104 03/01/2022    LABA1C 5.8 03/01/2022    LABA1C 5.0 07/10/2020       The 10-year ASCVD risk score (Aury Stoner et al., 2013) is: 15.9%    Values used to calculate the score:      Age: 80 years      Sex: Male      Is Non- : Yes      Diabetic: No      Tobacco smoker: No      Systolic Blood Pressure: 616 mmHg      Is BP treated: Yes      HDL Cholesterol: 74 mg/dL      Total Cholesterol: 308 mg/dL    Immunization History   Administered Date(s) Administered    COVID-19, Pfizer Purple top, DILUTE for use, 12+ yrs, 30mcg/0.3mL dose 02/24/2021, 03/17/2021    Influenza Vaccine, unspecified formulation 11/19/2012, 10/10/2013, 10/10/2014    Influenza Virus Vaccine 01/13/2016    Influenza, High Dose (Fluzone 65 yrs and older) 10/05/2017    Influenza, High-dose, Quadv, 65 yrs +, IM (Fluzone) 09/03/2020    Influenza, Quadv, IM, PF (6 mo and older Fluzone, Flulaval, Fluarix, and 3 yrs and older Afluria) 10/08/2018    Influenza, Triv, inactivated, subunit, adjuvanted, IM (Fluad 65 yrs and older) 09/23/2019    Pneumococcal Conjugate 13-valent ([id number]) 05/08/2017    Pneumococcal Polysaccharide ([id number]) 12/19/2016    Td vaccine (adult) 02/09/2007       Health Maintenance   Topic Date Due    Shingles Vaccine (1 of 2) Never done    DTaP/Tdap/Td vaccine (1 - Tdap) 02/10/2007    TSH testing  07/10/2021    COVID-19 Vaccine (3 - Booster for [employer] series) 08/17/2021    Annual Wellness Visit (AWV)  09/14/2022    Depression Screen  09/28/2022    A1C test (Diabetic or Prediabetic)  03/01/2023    Lipid screen  03/01/2023    Potassium monitoring  03/01/2023    Creatinine monitoring  03/01/2023    PSA counseling  03/01/2023    Colorectal Cancer Screen  08/05/2026    Flu vaccine  Completed    Pneumococcal 65+ years Vaccine  Completed    AAA screen  Completed    Hepatitis C screen  Completed    Hepatitis A vaccine  Aged Out    Hepatitis B vaccine  Aged Out    Hib vaccine  Aged Out    Meningococcal (ACWY) vaccine  Aged Out       Assessment & Plan     Chronic kidney disease, stage IV (severe) ([facility]  Recent lab work on 3/1/2022 showed BUN /Cr 64/2.6 reflecting possible pre-renal picture. Significantly higher than prior renal function tests. However, no signs and symptoms. No known anticipating events. Given patient history of HTN, other PMHx and age, he was referred to see nephrologist for further evaluation.  - Has an appointment with Dr. Lane Calvert on 3/18/2022. We will follow up   - Continue to stay well hydrated    Essential hypertension  Controlled. - Home BP monitoring  - Patient was instructed to bring his BP cuff and machine with him to his f/u appointment   - Continue with current regimen    Mixed hyperlipidemia  ASCVD score 15.9%  - Continue Crestor 20 mg nightly           Return in about 4 weeks (around 4/5/2022) for Follow up after being seen by Nephro.          --Anna Crouch MD

## 2022-04-05 ENCOUNTER — OFFICE VISIT (OUTPATIENT)
Dept: INTERNAL MEDICINE CLINIC | Age: 72
End: 2022-04-05
Payer: MEDICARE

## 2022-04-05 ENCOUNTER — HOSPITAL ENCOUNTER (OUTPATIENT)
Dept: ULTRASOUND IMAGING | Age: 72
Discharge: HOME | End: 2022-04-05
Payer: MEDICARE

## 2022-04-05 VITALS
BODY MASS INDEX: 20.67 KG/M2 | RESPIRATION RATE: 18 BRPM | TEMPERATURE: 98 F | DIASTOLIC BLOOD PRESSURE: 84 MMHG | WEIGHT: 120.4 LBS | OXYGEN SATURATION: 98 % | HEART RATE: 79 BPM | SYSTOLIC BLOOD PRESSURE: 135 MMHG

## 2022-04-05 DIAGNOSIS — I10 PRIMARY HYPERTENSION: Primary | ICD-10-CM

## 2022-04-05 DIAGNOSIS — N18.31 CHRONIC KIDNEY DISEASE (CKD) STAGE G3A/A1, MODERATELY DECREASED GLOMERULAR FILTRATION RATE (GFR) BETWEEN 45-59 ML/MIN/1.73 SQUARE METER AND ALBUMINURIA CREATININE RATIO LESS THAN 30 MG/G (HCC): ICD-10-CM

## 2022-04-05 PROCEDURE — 99211 OFF/OP EST MAY X REQ PHY/QHP: CPT | Performed by: STUDENT IN AN ORGANIZED HEALTH CARE EDUCATION/TRAINING PROGRAM

## 2022-04-05 PROCEDURE — 76770 US EXAM ABDO BACK WALL COMP: CPT

## 2022-04-07 NOTE — PROGRESS NOTES
Mr. Marcelo Pan was only seen as a nurse visit. He was instructed on his medications by the nurse.  He will return in  2 months

## 2022-05-19 RX ORDER — ROSUVASTATIN CALCIUM 20 MG/1
20 TABLET, COATED ORAL NIGHTLY
Qty: 90 TABLET | Refills: 1 | Status: SHIPPED | OUTPATIENT
Start: 2022-05-19 | End: 2022-06-14 | Stop reason: SDUPTHER

## 2022-05-23 ENCOUNTER — HOSPITAL ENCOUNTER (EMERGENCY)
Age: 72
Discharge: HOME | End: 2022-05-23
Attending: EMERGENCY MEDICINE
Payer: MEDICARE

## 2022-05-23 ENCOUNTER — APPOINTMENT (OUTPATIENT)
Dept: GENERAL RADIOLOGY | Age: 72
End: 2022-05-23
Payer: MEDICARE

## 2022-05-23 VITALS
HEART RATE: 62 BPM | HEIGHT: 64 IN | RESPIRATION RATE: 16 BRPM | WEIGHT: 127 LBS | BODY MASS INDEX: 21.68 KG/M2 | OXYGEN SATURATION: 100 % | TEMPERATURE: 98.6 F

## 2022-05-23 DIAGNOSIS — S40.022A ARM CONTUSION, LEFT, INITIAL ENCOUNTER: Primary | ICD-10-CM

## 2022-05-23 PROCEDURE — 73030 X-RAY EXAM OF SHOULDER: CPT

## 2022-05-23 PROCEDURE — 99283 EMERGENCY DEPT VISIT LOW MDM: CPT

## 2022-05-23 RX ORDER — ACETAMINOPHEN 325 MG/1
650 TABLET ORAL EVERY 6 HOURS PRN
COMMUNITY

## 2022-05-23 ASSESSMENT — PAIN DESCRIPTION - LOCATION: LOCATION: ARM

## 2022-05-23 ASSESSMENT — PAIN DESCRIPTION - ORIENTATION: ORIENTATION: LEFT

## 2022-05-23 ASSESSMENT — PAIN SCALES - GENERAL: PAINLEVEL_OUTOF10: 7

## 2022-05-23 ASSESSMENT — PAIN DESCRIPTION - FREQUENCY: FREQUENCY: CONTINUOUS

## 2022-05-23 ASSESSMENT — PAIN DESCRIPTION - DESCRIPTORS: DESCRIPTORS: ACHING

## 2022-05-23 ASSESSMENT — PAIN DESCRIPTION - PAIN TYPE: TYPE: ACUTE PAIN

## 2022-05-23 ASSESSMENT — PAIN - FUNCTIONAL ASSESSMENT: PAIN_FUNCTIONAL_ASSESSMENT: 0-10

## 2022-05-23 NOTE — ED PROVIDER NOTES
[address] [facility]          ATTENDING PHYSICIAN NOTE       Date of evaluation: 5/23/2022    Chief Complaint     Arm Pain (L arm pain, fell in shower last week and having pain since)      History of Present Illness     Tiffanie Voss is a 71 y.o. male who presents complaints of left shoulder pain after a fall in the bathtub approximately a week ago. The patient has not noted any limitations in range of motion. He landed on his left shoulder and localizes the pain mostly to the left shoulder and upper humerus. He has been taking Tylenol. It has continued to hurt him therefore he came in to get it evaluated. He denies any other injuries. Review of Systems     Denies numbness, paresthesia, weakness. See HPI for further details. Review of systems otherwise negative. Past Medical, Surgical, Family, and Social History     Nursing Notes, Past Medical Hx, Past Surgical Hx, Social Hx, Allergies, and Family Hx were all reviewed in the electronic record and agreed with, or any disagreements were addressed in the HPI or corrected in the EMR. Medications     Current Discharge Medication List      CONTINUE these medications which have NOT CHANGED    Details   acetaminophen (TYLENOL) 325 MG tablet Take 650 mg by mouth every 6 hours as needed for Pain      rosuvastatin (CRESTOR) 20 MG tablet Take 1 tablet by mouth at bedtime  Qty: 90 tablet, Refills: 1      NIFEdipine (PROCARDIA XL) 30 MG extended release tablet Take 1 tablet by mouth daily  Qty: 30 tablet, Refills: 0    Associated Diagnoses: Essential hypertension      Blood Pressure Monitoring (BLOOD PRESS MONITOR/M-L CUFF) MISC 1 applicator by Does not apply route 2 times daily  Qty: 1 each, Refills: 0      vitamin B-12 (CYANOCOBALAMIN) 1000 MCG tablet Take 1,000 mcg by mouth daily      Lift Chair MISC by Does not apply route  Qty: 1 each, Refills: 0    Associated Diagnoses: Chronic pain of left elbow;  Falls infrequently; Bilateral carpal tunnel syndrome; Cervical cord compression with myelopathy (HCC); MGUS (monoclonal gammopathy of unknown significance)      sildenafil (VIAGRA) 100 MG tablet Take 1 tablet by mouth as needed for Erectile Dysfunction  Qty: 6 tablet, Refills: 3    Associated Diagnoses: Erectile dysfunction, unspecified erectile dysfunction type      folic acid (FOLVITE) 1 MG tablet Take 1 tablet by mouth daily. Qty: 30 tablet, Refills: 5      fenofibrate (TRICOR) 145 MG tablet Take 1 tablet by mouth daily. Qty: 30 tablet, Refills: 5      Ascorbic Acid (VITAMIN C) 500 MG tablet Take 500 mg by mouth daily              Allergies     He has No Known Allergies. Physical Exam     INITIAL VITALS:  , Temp: 98.6 °F (37 °C), Pulse: 62, Resp: 16, SpO2: 100 %   Constitutional:  Well developed, well nourished, no acute distress, non-toxic appearance   Musculoskeletal: Tenderness to palpation over the left shoulder and proximal humerus. He has full range of motion of the left shoulder joint. There is no swelling noted. Integument:  Well hydrated, no rash, no ecchymosis, no abrasions  Neurologic/Vascular:  Distal neurovascular exam is intact with no motor or sensory deficits      Diagnostic Results     RADIOLOGY:  XR SHOULDER LEFT (MIN 2 VIEWS)   Final Result   Impression: No acute osseous abnormality. RECENT VITALS:   , Temp: 98.6 °F (37 °C), Pulse: 62, Resp: 16, SpO2: 100 %       ED Course     The patient was given the following medications:  No orders of the defined types were placed in this encounter. CONSULTS:  None    MEDICAL DECISION MAKING / ASSESSMENT / Theodora Delia is a 71 y.o. male presenting with left shoulder pain 1 week out from an injury. He had fallen in the bathtub and had continued pain and therefore came to get it evaluated. Imaging does not reveal any obvious fracture or dislocation.   It will be recommended that he continue Tylenol and may also use ibuprofen for pain but no other specific interventions are necessary at this time. He will be treated as a contusion. Clinical Impression     1. Arm contusion, left, initial encounter        Disposition     PATIENT REFERRED TO:  Jeremias Pratt MD  [address] [address]  [phone]      As needed      DISCHARGE MEDICATIONS:  Current Discharge Medication List          DISPOSITION Decision To Discharge 05/23/2022 09:17:48 AM        Hermelindo Rubio MD  05/23/22 5312

## 2022-06-14 RX ORDER — ROSUVASTATIN CALCIUM 20 MG/1
20 TABLET, COATED ORAL NIGHTLY
Qty: 90 TABLET | Refills: 1 | Status: SHIPPED | OUTPATIENT
Start: 2022-06-14 | End: 2022-07-26 | Stop reason: SDUPTHER

## 2022-07-14 RX ORDER — ROSUVASTATIN CALCIUM 20 MG/1
20 TABLET, COATED ORAL NIGHTLY
Qty: 90 TABLET | Refills: 1 | Status: CANCELLED | OUTPATIENT
Start: 2022-07-14 | End: 2022-08-13

## 2022-07-21 RX ORDER — ROSUVASTATIN CALCIUM 20 MG/1
20 TABLET, COATED ORAL NIGHTLY
Qty: 90 TABLET | Refills: 1 | Status: CANCELLED | OUTPATIENT
Start: 2022-07-21

## 2022-07-26 RX ORDER — ROSUVASTATIN CALCIUM 20 MG/1
20 TABLET, COATED ORAL NIGHTLY
Qty: 90 TABLET | Refills: 1 | Status: SHIPPED | OUTPATIENT
Start: 2022-07-26

## 2023-01-25 LAB
ALBUMIN SERPL-MCNC: 4.4 G/DL (ref 3.4–5)
ANION GAP SERPL CALCULATED.3IONS-SCNC: 13 MMOL/L (ref 3–16)
BUN BLDV-MCNC: 18 MG/DL (ref 7–20)
CALCIUM SERPL-MCNC: 9.7 MG/DL (ref 8.3–10.6)
CHLORIDE BLD-SCNC: 98 MMOL/L (ref 99–110)
CO2: 27 MMOL/L (ref 21–32)
CREAT SERPL-MCNC: 1.1 MG/DL (ref 0.8–1.3)
CREATININE URINE: 206.9 MG/DL (ref 39–259)
GFR SERPL CREATININE-BSD FRML MDRD: >60 ML/MIN/{1.73_M2}
GLUCOSE BLD-MCNC: 103 MG/DL (ref 70–99)
PHOSPHORUS: 2.9 MG/DL (ref 2.5–4.9)
POTASSIUM SERPL-SCNC: 3.6 MMOL/L (ref 3.5–5.1)
PROTEIN PROTEIN: 227 MG/DL
PROTEIN/CREAT RATIO: 1.1 MG/DL
SODIUM BLD-SCNC: 138 MMOL/L (ref 136–145)

## 2023-03-13 RX ORDER — ROSUVASTATIN CALCIUM 20 MG/1
20 TABLET, COATED ORAL NIGHTLY
Qty: 90 TABLET | Refills: 1 | Status: SHIPPED | OUTPATIENT
Start: 2023-03-13

## 2023-03-13 RX ORDER — ROSUVASTATIN CALCIUM 20 MG/1
20 TABLET, COATED ORAL NIGHTLY
Qty: 90 TABLET | Refills: 1 | Status: CANCELLED | OUTPATIENT
Start: 2023-03-13

## 2023-04-25 ENCOUNTER — OFFICE VISIT (OUTPATIENT)
Dept: INTERNAL MEDICINE CLINIC | Age: 73
End: 2023-04-25
Payer: MEDICARE

## 2023-04-25 VITALS
TEMPERATURE: 99 F | RESPIRATION RATE: 16 BRPM | BODY MASS INDEX: 19.76 KG/M2 | DIASTOLIC BLOOD PRESSURE: 100 MMHG | HEART RATE: 57 BPM | OXYGEN SATURATION: 96 % | WEIGHT: 115.1 LBS | SYSTOLIC BLOOD PRESSURE: 180 MMHG

## 2023-04-25 DIAGNOSIS — I10 ESSENTIAL HYPERTENSION: Primary | ICD-10-CM

## 2023-04-25 PROCEDURE — 99213 OFFICE O/P EST LOW 20 MIN: CPT | Performed by: STUDENT IN AN ORGANIZED HEALTH CARE EDUCATION/TRAINING PROGRAM

## 2023-04-25 ASSESSMENT — ENCOUNTER SYMPTOMS
CHEST TIGHTNESS: 0
ABDOMINAL DISTENTION: 0
STRIDOR: 0
CHOKING: 0
APNEA: 0
NAUSEA: 0
VOMITING: 0
DIARRHEA: 0
WHEEZING: 0
ABDOMINAL PAIN: 0
CONSTIPATION: 0
SHORTNESS OF BREATH: 0
VOICE CHANGE: 0
TROUBLE SWALLOWING: 0
COUGH: 0
PHOTOPHOBIA: 0

## 2023-04-25 NOTE — PATIENT INSTRUCTIONS
- Please return to the clinic in 1 week with a blood pressure log. Please check your blood pressure once a day a few hours after taking your blood pressure medication. If your blood pressures are consistently above 160 please call the clinic for an earlier appointment. If you start to have symptoms with headaches or vision changes please call us as well  -Increase your protein intake    Have a great day!

## 2023-04-25 NOTE — PROGRESS NOTES
2023    Mj Kimball (:  1950) is a 71 y.o. male, here for a follow up visit for BP check     HTN   Reading in the office today is 200/105, 110/75, 190/96, 180/100 manual.  Patient did not take his medications this morning. Patient was advised to bring his blood pressure cuff in and monitor blood pressure readings until next visit in 1 week. Patient denies chest pain shortness of breath nausea vomiting diarrhea dysuria. He has no acute complaints. Patient does state som he is concerned about gaining weight. He reports eating 5-6 large meals a day. Does not have any abdominal pain flatulence diarrhea malabsorptive symptoms. His weight today is 115 pounds down from 127 in . Patient states he is very active. He has an exercise bike and walks up and down stairs constantly. Colonoscopy screening up-to-date. Patient Active Problem List   Diagnosis    Cervical cord compression with myelopathy (HCC)    Anemia, macrocytic    ETOH abuse    Cataract    Weight loss    H/O prostate cancer    Hyperthyroidism    Graves disease    MGUS (monoclonal gammopathy of unknown significance)    Abnormal ultrasound of kidney    Essential hypertension    Bilateral carpal tunnel syndrome    Chronic pain of left elbow    Embedded earring of left ear    Falls infrequently    Neutropenia, unspecified type (HCC)    Prostate cancer (HCC)    Chronic kidney disease, stage IV (severe) ([facility] [address] [facility]       Review of Systems   Constitutional:  Negative for activity change, appetite change, chills, diaphoresis, fatigue, fever and unexpected weight change. HENT:  Negative for trouble swallowing and voice change. Eyes:  Negative for photophobia and visual disturbance. Respiratory:  Negative for apnea, cough, choking, chest tightness, shortness of breath, wheezing and stridor. Cardiovascular:  Negative for chest pain, palpitations and leg swelling.    Gastrointestinal:  Negative for abdominal distention, abdominal pain,

## 2023-05-02 ENCOUNTER — OFFICE VISIT (OUTPATIENT)
Dept: INTERNAL MEDICINE CLINIC | Age: 73
End: 2023-05-02
Payer: MEDICARE

## 2023-05-02 VITALS
SYSTOLIC BLOOD PRESSURE: 194 MMHG | HEART RATE: 61 BPM | TEMPERATURE: 98.9 F | BODY MASS INDEX: 19.59 KG/M2 | RESPIRATION RATE: 16 BRPM | WEIGHT: 114.1 LBS | DIASTOLIC BLOOD PRESSURE: 118 MMHG

## 2023-05-02 DIAGNOSIS — I10 ESSENTIAL HYPERTENSION: Primary | ICD-10-CM

## 2023-05-02 PROCEDURE — 99213 OFFICE O/P EST LOW 20 MIN: CPT | Performed by: STUDENT IN AN ORGANIZED HEALTH CARE EDUCATION/TRAINING PROGRAM

## 2023-05-02 RX ORDER — HYDROCHLOROTHIAZIDE 12.5 MG/1
12.5 CAPSULE, GELATIN COATED ORAL EVERY MORNING
Qty: 90 CAPSULE | Refills: 1 | Status: SHIPPED | OUTPATIENT
Start: 2023-05-02

## 2023-05-02 RX ORDER — BLOOD PRESSURE TEST KIT
1 KIT MISCELLANEOUS DAILY
Qty: 1 KIT | Refills: 0 | Status: SHIPPED | OUTPATIENT
Start: 2023-05-02

## 2023-05-02 ASSESSMENT — ENCOUNTER SYMPTOMS
VOMITING: 0
CONSTIPATION: 0
COUGH: 0
APNEA: 0
TROUBLE SWALLOWING: 0
STRIDOR: 0
CHEST TIGHTNESS: 0
CHOKING: 0
ABDOMINAL PAIN: 0
NAUSEA: 0
WHEEZING: 0
DIARRHEA: 0
VOICE CHANGE: 0
SHORTNESS OF BREATH: 0
ABDOMINAL DISTENTION: 0
PHOTOPHOBIA: 0

## 2023-05-02 NOTE — PROGRESS NOTES
2023    Melvi Choi (:  1950) is a 71 y.o. male, here for a follow up visit for BP check     HTN   Reading in the office today is 177/96?94/118. Patient did take his medications this morning. Patient denies chest pain shortness of breath nausea vomiting diarrhea dysuria. He has no acute complaints. Patient states he is very active. He has an exercise bike and walks up and down stairs constantly. Colonoscopy screening up-to-date. Patient Active Problem List   Diagnosis    Cervical cord compression with myelopathy (HCC)    Anemia, macrocytic    ETOH abuse    Cataract    Weight loss    H/O prostate cancer    Hyperthyroidism    Graves disease    MGUS (monoclonal gammopathy of unknown significance)    Abnormal ultrasound of kidney    Essential hypertension    Bilateral carpal tunnel syndrome    Chronic pain of left elbow    Embedded earring of left ear    Falls infrequently    Neutropenia, unspecified type (HCC)    Prostate cancer (HCC)    Chronic kidney disease, stage IV (severe) ([facility] [address]       Review of Systems   Constitutional:  Negative for activity change, appetite change, chills, diaphoresis, fatigue, fever and unexpected weight change. HENT:  Negative for trouble swallowing and voice change. Eyes:  Negative for photophobia and visual disturbance. Respiratory:  Negative for apnea, cough, choking, chest tightness, shortness of breath, wheezing and stridor. Cardiovascular:  Negative for chest pain, palpitations and leg swelling. Gastrointestinal:  Negative for abdominal distention, abdominal pain, constipation, diarrhea, nausea and vomiting. Genitourinary:  Negative for difficulty urinating and dysuria. Skin:  Negative for rash and wound. Neurological:  Negative for dizziness, weakness and light-headedness. Psychiatric/Behavioral:  Negative for agitation and behavioral problems. Prior to Visit Medications    Medication Sig Taking?  Authorizing Provider   Blood

## 2023-05-02 NOTE — PATIENT INSTRUCTIONS
- Please continue taking nifedipine 30 mg daily. In addition I have sent a prescription for hydrochlorothiazide to your [address]. Please take this in addition to the nifedipine. Please keep a log of your blood pressures and bring it into your next appointment in 1 week. Please limit your salt intake. Have a great day!

## 2023-05-03 DIAGNOSIS — I10 ESSENTIAL HYPERTENSION: ICD-10-CM

## 2023-05-03 RX ORDER — NIFEDIPINE 30 MG/1
30 TABLET, EXTENDED RELEASE ORAL DAILY
Qty: 90 TABLET | Refills: 0 | Status: SHIPPED | OUTPATIENT
Start: 2023-05-03 | End: 2023-08-01

## 2023-05-03 RX ORDER — NIFEDIPINE 30 MG/1
30 TABLET, EXTENDED RELEASE ORAL DAILY
Qty: 30 TABLET | Refills: 0 | Status: SHIPPED | OUTPATIENT
Start: 2023-05-03 | End: 2023-05-03 | Stop reason: SDUPTHER

## 2023-05-09 ENCOUNTER — OFFICE VISIT (OUTPATIENT)
Dept: INTERNAL MEDICINE CLINIC | Age: 73
End: 2023-05-09
Payer: MEDICARE

## 2023-05-09 VITALS
TEMPERATURE: 98.1 F | RESPIRATION RATE: 14 BRPM | DIASTOLIC BLOOD PRESSURE: 95 MMHG | SYSTOLIC BLOOD PRESSURE: 139 MMHG | BODY MASS INDEX: 19.24 KG/M2 | HEART RATE: 60 BPM | WEIGHT: 112.1 LBS | OXYGEN SATURATION: 92 %

## 2023-05-09 DIAGNOSIS — I10 ESSENTIAL HYPERTENSION: Primary | ICD-10-CM

## 2023-05-09 PROCEDURE — 99213 OFFICE O/P EST LOW 20 MIN: CPT | Performed by: STUDENT IN AN ORGANIZED HEALTH CARE EDUCATION/TRAINING PROGRAM

## 2023-05-09 RX ORDER — LANOLIN ALCOHOL/MO/W.PET/CERES
1000 CREAM (GRAM) TOPICAL DAILY
Qty: 30 TABLET | Refills: 5 | Status: SHIPPED | OUTPATIENT
Start: 2023-05-09

## 2023-05-09 ASSESSMENT — ENCOUNTER SYMPTOMS
ABDOMINAL PAIN: 0
WHEEZING: 0
APNEA: 0
SHORTNESS OF BREATH: 0
COUGH: 0
CHOKING: 0
STRIDOR: 0
ABDOMINAL DISTENTION: 0
CHEST TIGHTNESS: 0
PHOTOPHOBIA: 0
VOICE CHANGE: 0
DIARRHEA: 0
VOMITING: 0
NAUSEA: 0
CONSTIPATION: 0
TROUBLE SWALLOWING: 0

## 2023-05-09 NOTE — PROGRESS NOTES
Your Child's Health  Six-Month-Old Visit      Sharon Dietz  September 15, 2016    Visit Vitals  Temp 98.4 °F (36.9 °C) (Tympanic)   Ht 25.5\" (64.8 cm)   Wt 6.13 kg   HC 40.6 cm (16\")   BMI 14.61 kg/m²     Weight: 13.51 lbs    NUTRITION:  In the next few months, Sharon can adopt a feeding schedule closer to yours.  While you may not choose to feed her at the table, having Sharon sit with you will help her become accustomed to family meals.  Babies are ready for finger foods at about the time that they can sit alone (7-8 months) and start to use their index finger separately from their other fingers (about 9 months).  Much will depend on the baby's temperament.  Some babies are \"nibblers\" and do well with finger foods early; others push the food into their mouths to the point of gagging.  If you try finger foods and your baby has a problem with them, wait a week or so before trying again.  Start with finger foods that will become soft with saliva - for example, teething biscuits, cereal pieces, etc.  Teeth are not necessary at this point.       Because we have less sunlight in our part of the [place], infants whose only source of milk is mother's milk should have Vitamin D supplements (available without prescription at the drug store) each day.      We no longer consider juice a health food.  When fruits were not available, it was a nutritional help, but fruits are much better nutritionally than juice. Amounts of juice over 4 oz. per day are associated with an increased risk of obesity.    Avoid using food, especially sweets, to keep Sharon from crying.    FLUORIDE: Sharon will begin depositing enamel on the permanent teeth at this age.  Ask your pediatrician or nurse whether fluoride supplements are advisable if you have not already discussed this.    DEVELOPMENT/BEHAVIOR:  Over the next few months, many babies will push up on outstretched arms, and they will then go up on their hands and knees.  After rocking back and  forth for a few weeks, they may start crawling.  Some will try to pull up to a standing position.   Many babies, however, do not crawl until after walking.  Single consonant sounds (such as \"da,\" \"ma,\" or \"ba\") will lead to repetitive sounds (\"catracho,\" \"mama,\" or \"baba\").  At this age, babies grab everything, and it all goes into their mouths; so you should be careful to protect your baby from choking and poisoning.  Most babies at this age can sleep all night, often ten to twelve hours.  Most babies take at least one nap a day, but some don't nap at all.    ACCIDENT PREVENTION:  1.  Falls:  Use guerra on stairways and at the entrances to rooms that cannot be child-proofed.  2.  Scalding:  Adjust your hot water heater temperature to 120-130 degrees F.  Guard hot radiators or vents.  3.  Walkers:  These have been associated with a variety of injuries.  Stationary saucers and/or jumpers are safer.  4.  Electricity:  Protect Sharon from access to electrical cords and outlets. Use safety plugs.  5.  Poisoning:  Remove the following items from reach or place them in a locked cabinet:     Cleaning products     Kerosene (lamp oil)     Paint     Pesticides     Purses (which sometimes contain medicines)     Plants      Medicines, including vitamins and birth control pills         Use safety caps on medicines.  Household  and polishes must be kept out of reach. Store medicines and  out of sight.  Don't transfer medicines to non-child-proofed or unlabeled containers. Dispose of unused medications. Be especially careful when visiting older persons or families without children in the house.  They may be in the habit of keeping their medicines out on tables.       Syrup of Ipecac is no longer recommended to be kept in the home. Please dispose of any remaining supplies.       Call the Poison Center at [phone] for any known or suspected poisoning.     CAR SAFETY:  An approved rear facing car seat in the back seat  of the car is required by [place] law until Sharon is two years old.  A rear-facing car seat in the back seat is the safest place for your baby. This is especially true if your car is equipped with passenger-side air bags.  Forward-facing seats are not recommended until your child is over two years of age.    SMOKING:  Children exposed to tobacco smoke have more ear infections and pneumonia.  If you smoke, please quit.  If you cannot quit, smoke outside.  Do not smoke near Sharon, do not smoke in the car and do not let others smoke near her.    SUN EXPOSURE:  If you plan to have Sharon outside for more than 30 minutes, please use sun screen.    TEETHING:  Teething children may have no symptoms at all, or they may experience drooling, rashes, crabbiness, or changes in diet.  Do not assume that a fever is due to teething.  Temperatures over 101 degrees are usually from some other cause.  Once the teeth erupt, you should begin cleaning Sharon’s teeth with a washcloth, gauze, or soft toothbrush.  Toothpaste is not necessary yet.      LEAD EXPOSURE:  Sharon will be more mobile in the next few months.  If there is lead in the house, she may be vulnerable.  Let us know if any of the following apply:  1.  Your home was built before 1978 and has peeling or chipping or chalking paint. Homes built in older cities at the turn of the last century may have lead pipes. Check to see if any of the above applies to Sharon's sitter's home, , , or any other house where she spends time.  2.  You have another child or housemate who is being followed or treated for an elevated lead level.  3.  Sharon lives with an adult whose job or hobby involves lead exposure (soldering, battery manufacture, recycling, casting, stained glass, etc.).  4.  You live near an active lead smelter, a battery recycling plant, or a plant that processes hot metal.    DROWNING:  Never leave infants alone in or near water.    MEDICATION FOR FEVER OR PAIN:    Acetaminophen liquid (e.g., Tylenol or Tempra) may be given every four hours as needed for pain or fever.      INFANT/CHILDREN’S Tylenol/Acetaminophen  (160 MG/5 mL)  Child’s Weight: Dose:  12 - 17 pounds:   80 mg (2.5 mL  (1/2 Teaspoon))  18 - 23 pounds:   120 mg (3.75 mL (3/4 Teaspoon))    Beginning at 6 months of age, Children's Ibuprofen (e.g., Advil or Motrin) may be given every six hours as needed for pain or fever.    Child’s Weight: Dose:  13 - 17 pounds:   1/4 - 1/2 Teaspoon  18 - 23 pounds:   1/2 - 1 Teaspoon    If Sharon is outside these weight ranges, call your pediatrician's office for advice.     Most Recent Immunizations   Administered Date(s) Administered   • Hepatitis B Child 2017   Pended Date(s) Pended   • Influenza Quadrivalent Preservative Free 2017       If Sharon develops any of the following reactions within 72 hours after an immunization, notify your pediatrician by calling the pediatric phone nurse:  1.  A temperature of 105 degrees or above.  2.  More than 3 hours of continuous crying.  3.  A shrill, high-pitched cry.  4.  A pale, limp spell.  5.  A seizure or fainting spell.  In this case, you should call 911 or go immediately to the emergency room.      NEXT VISIT: NINE MONTHS OF AGE    Thank you for entrusting your care to [facility].   : Yes      Diabetic: No      Tobacco smoker: No      Systolic Blood Pressure: 214 mmHg      Is BP treated: Yes      HDL Cholesterol: 74 mg/dL      Total Cholesterol: 308 mg/dL    Immunization History   Administered Date(s) Administered    COVID-19, PFIZER PURPLE top, DILUTE for use, (age 15 y+), 30mcg/0.3mL 02/24/2021, 03/17/2021    Influenza Vaccine, unspecified formulation 11/19/2012, 10/10/2013, 10/10/2014    Influenza Virus Vaccine 01/13/2016    Influenza, FLUARIX, FLULAVAL, FLUZONE (age 10 mo+) AND AFLURIA, (age 1 y+), PF, 0.5mL 10/08/2018    Influenza, FLUZONE (age 72 y+), High Dose, 0.7mL 09/03/2020    Influenza, High Dose (Fluzone 65 yrs and older) 10/05/2017    Influenza, Triv, inactivated, subunit, adjuvanted, IM (Fluad 65 yrs and older) 09/23/2019    Pneumococcal, PCV-13, PREVNAR 13, (age 6w+), IM, 0.5mL 05/08/2017    Pneumococcal, PPSV23, PNEUMOVAX 23, (age 2y+), SC/IM, 0.5mL 12/19/2016    Td vaccine (adult) 02/09/2007       Health Maintenance   Topic Date Due    Shingles vaccine (1 of 2) Never done    DTaP/Tdap/Td vaccine (1 - Tdap) 02/10/2007    COVID-19 Vaccine (3 - Booster for Pfizer series) 05/12/2021    Annual Wellness Visit (AWV)  09/14/2022    Depression Screen  09/28/2022    A1C test (Diabetic or Prediabetic)  03/01/2023    Lipids  03/01/2023    Prostate Specific Antigen (PSA) Screening or Monitoring  03/01/2023    Flu vaccine (Season Ended) 08/01/2023    GFR test (Diabetes, CKD 3-4, OR last GFR 15-59)  01/25/2024    Colorectal Cancer Screen  08/05/2026    Pneumococcal 65+ years Vaccine  Completed    AAA screen  Completed    Hepatitis C screen  Completed    Hepatitis A vaccine  Aged Out    Hib vaccine  Aged Out    Meningococcal (ACWY) vaccine  Aged Out       Assessment & Plan     Essential hypertension  Better controlled  - Home BP monitoring  - Patient was instructed to bring his BP cuff and machine with him to his f/u appointment   -Continue nifedipine 30 mg daily,

## 2023-05-09 NOTE — PATIENT INSTRUCTIONS
Please return to the clinic in 1 month  Please bring in your blood pressure log and cuff at your next visit  Call if you need any refills    Happy early birthday!

## 2023-06-06 ENCOUNTER — OFFICE VISIT (OUTPATIENT)
Dept: INTERNAL MEDICINE CLINIC | Age: 73
End: 2023-06-06
Payer: MEDICARE

## 2023-06-06 VITALS
HEIGHT: 64 IN | OXYGEN SATURATION: 100 % | WEIGHT: 114.3 LBS | RESPIRATION RATE: 20 BRPM | TEMPERATURE: 97.9 F | HEART RATE: 59 BPM | SYSTOLIC BLOOD PRESSURE: 151 MMHG | BODY MASS INDEX: 19.52 KG/M2 | DIASTOLIC BLOOD PRESSURE: 93 MMHG

## 2023-06-06 DIAGNOSIS — I10 ESSENTIAL HYPERTENSION: Primary | ICD-10-CM

## 2023-06-06 PROCEDURE — 99213 OFFICE O/P EST LOW 20 MIN: CPT | Performed by: STUDENT IN AN ORGANIZED HEALTH CARE EDUCATION/TRAINING PROGRAM

## 2023-06-06 ASSESSMENT — ENCOUNTER SYMPTOMS
APNEA: 0
CONSTIPATION: 0
WHEEZING: 0
CHOKING: 0
ABDOMINAL PAIN: 0
COUGH: 0
NAUSEA: 0
ABDOMINAL DISTENTION: 0
STRIDOR: 0
DIARRHEA: 0
VOMITING: 0
SHORTNESS OF BREATH: 0
CHEST TIGHTNESS: 0
TROUBLE SWALLOWING: 0
VOICE CHANGE: 0
PHOTOPHOBIA: 0

## 2023-06-06 NOTE — PATIENT INSTRUCTIONS
- No changes have been made to your medications  - Your blood pressure has been well controlled, just continue to keep an eye on your salt intake  - Return to the clinic in 3 months    Have a great day!

## 2023-06-06 NOTE — PROGRESS NOTES
hypertension  Better controlled  - Home BP monitoring  - Patient was instructed to bring his BP cuff and machine with him to his f/u appointment   -Continue nifedipine 30 mg daily, hydrochlorothiazide 12.5 daily    Mixed hyperlipidemia  ASCVD score 15.9%  - Continue Crestor 20 mg nightly       Return in about 3 months (around 9/6/2023).          Jonathan Quigley, DO  PGY-3, Internal Medicine'

## 2023-09-05 ENCOUNTER — OFFICE VISIT (OUTPATIENT)
Dept: INTERNAL MEDICINE CLINIC | Age: 73
End: 2023-09-05
Payer: MEDICARE

## 2023-09-05 VITALS
HEART RATE: 60 BPM | SYSTOLIC BLOOD PRESSURE: 156 MMHG | TEMPERATURE: 98.5 F | DIASTOLIC BLOOD PRESSURE: 95 MMHG | BODY MASS INDEX: 19.41 KG/M2 | WEIGHT: 113.1 LBS | RESPIRATION RATE: 16 BRPM | OXYGEN SATURATION: 96 %

## 2023-09-05 DIAGNOSIS — N18.4 CHRONIC KIDNEY DISEASE, STAGE IV (SEVERE) (HCC): ICD-10-CM

## 2023-09-05 DIAGNOSIS — N18.4 CHRONIC KIDNEY DISEASE, STAGE IV (SEVERE) (HCC): Primary | ICD-10-CM

## 2023-09-05 DIAGNOSIS — D53.9 ANEMIA, MACROCYTIC: ICD-10-CM

## 2023-09-05 DIAGNOSIS — R93.429 ABNORMAL ULTRASOUND OF KIDNEY: ICD-10-CM

## 2023-09-05 DIAGNOSIS — I10 ESSENTIAL HYPERTENSION: ICD-10-CM

## 2023-09-05 LAB
ALBUMIN SERPL-MCNC: 4.8 G/DL (ref 3.4–5)
ALBUMIN/GLOB SERPL: 1.8 {RATIO} (ref 1.1–2.2)
ALP SERPL-CCNC: 60 U/L (ref 40–129)
ALT SERPL-CCNC: 8 U/L (ref 10–40)
ANION GAP SERPL CALCULATED.3IONS-SCNC: 12 MMOL/L (ref 3–16)
AST SERPL-CCNC: 16 U/L (ref 15–37)
BILIRUB SERPL-MCNC: 0.7 MG/DL (ref 0–1)
BUN SERPL-MCNC: 28 MG/DL (ref 7–20)
CALCIUM SERPL-MCNC: 9.7 MG/DL (ref 8.3–10.6)
CHLORIDE SERPL-SCNC: 100 MMOL/L (ref 99–110)
CO2 SERPL-SCNC: 28 MMOL/L (ref 21–32)
CREAT SERPL-MCNC: 1.4 MG/DL (ref 0.8–1.3)
GFR SERPLBLD CREATININE-BSD FMLA CKD-EPI: 53 ML/MIN/{1.73_M2}
GLUCOSE SERPL-MCNC: 96 MG/DL (ref 70–99)
POTASSIUM SERPL-SCNC: 4.7 MMOL/L (ref 3.5–5.1)
PROT SERPL-MCNC: 7.5 G/DL (ref 6.4–8.2)
SODIUM SERPL-SCNC: 140 MMOL/L (ref 136–145)

## 2023-09-05 PROCEDURE — 99213 OFFICE O/P EST LOW 20 MIN: CPT

## 2023-09-05 RX ORDER — FOLIC ACID 1 MG/1
1 TABLET ORAL
COMMUNITY
Start: 2019-03-18

## 2023-09-05 ASSESSMENT — ENCOUNTER SYMPTOMS
ABDOMINAL DISTENTION: 0
BACK PAIN: 0
CONSTIPATION: 0
FACIAL SWELLING: 0
DIARRHEA: 0
SHORTNESS OF BREATH: 0
CHEST TIGHTNESS: 0
APNEA: 0
PHOTOPHOBIA: 0
SINUS PRESSURE: 0
BLOOD IN STOOL: 0
ABDOMINAL PAIN: 0
VOMITING: 0
WHEEZING: 0
COLOR CHANGE: 0
TROUBLE SWALLOWING: 0
VOICE CHANGE: 0
NAUSEA: 0
SORE THROAT: 0
COUGH: 0
STRIDOR: 0
CHOKING: 0

## 2023-09-05 NOTE — PATIENT INSTRUCTIONS
Please refer to the lab for your blood test today. Please continue to take your medications as previously discussed. Please come back to the clinic in 3-4 months for your follow up appointment    Please follow up for your next MRI in about 6 months.      Please follow up with your gastroenterologist for colonoscopy screening

## 2023-09-05 NOTE — ASSESSMENT & PLAN NOTE
home readings show  BP under control  - cont current meds:  Nifedipine 30 mg PO QD  HCTZ 12.5 mg PO QD

## 2023-09-05 NOTE — PROGRESS NOTES
Carter Wolf (:  1950) is a 68 y.o. male,Established patient, here for evaluation of the following chief complaint(s):  Hypertension (Pressures here slightly up . See his list of pressures)         ASSESSMENT/PLAN:  1. Chronic kidney disease, stage IV (severe) (HCC)  Assessment & Plan:  BP under control w current meds  Last RFP in 2023  - CMP   Orders:  -     Comprehensive Metabolic Panel; Future  -     MRI ABDOMEN WO CONTRAST; Future  2. Abnormal ultrasound of kidney  Assessment & Plan:  MRI from  stable lesion  - f/u MRI in 6 months  Orders:  -     Comprehensive Metabolic Panel; Future  -     MRI ABDOMEN WO CONTRAST; Future  3. Essential hypertension  Assessment & Plan:   home readings show  BP under control  - cont current meds:  Nifedipine 30 mg PO QD  HCTZ 12.5 mg PO QD  Orders:  -     Comprehensive Metabolic Panel; Future  -     MRI ABDOMEN WO CONTRAST; Future  4. Anemia, macrocytic  Assessment & Plan:  Age >71, screen for GI abnormalities  - refer to GI for c-scope appointment  Orders:  -     Comprehensive Metabolic Panel; Future  -     MRI ABDOMEN WO CONTRAST; Future  -     [facility]FL - Clover Tang MD, Gastroenterology, [facility]      F/u in 3-4 months         Subjective   SUBJECTIVE/OBJECTIVE:  HTN f/u  Denies CP, palpitation, headache, blurred vision      Review of Systems   Constitutional:  Negative for activity change, appetite change, chills, diaphoresis, fatigue, fever and unexpected weight change. HENT:  Negative for facial swelling, sinus pressure, sore throat, tinnitus, trouble swallowing and voice change. Eyes:  Negative for photophobia and visual disturbance. Respiratory:  Negative for apnea, cough, choking, chest tightness, shortness of breath, wheezing and stridor. Cardiovascular:  Negative for chest pain, palpitations and leg swelling.    Gastrointestinal:  Negative for abdominal distention, abdominal pain, blood in stool, constipation, diarrhea, nausea and

## 2023-09-22 DIAGNOSIS — I10 ESSENTIAL HYPERTENSION: ICD-10-CM

## 2023-09-22 RX ORDER — NIFEDIPINE 30 MG/1
30 TABLET, EXTENDED RELEASE ORAL DAILY
Qty: 90 TABLET | Refills: 0 | Status: SHIPPED | OUTPATIENT
Start: 2023-09-22 | End: 2023-12-21

## 2023-09-22 NOTE — TELEPHONE ENCOUNTER
Requested Prescriptions     Pending Prescriptions Disp Refills    NIFEdipine (PROCARDIA XL) 30 MG extended release tablet 90 tablet 0     Sig: Take 1 tablet by mouth daily       Last Clinic Visit:  9/5/2023     Next Clinic Appointment:  12/20/2023

## 2023-09-22 NOTE — TELEPHONE ENCOUNTER
PT NEED REFILL ON NIFEDIPINE. PLEASE SEND TO [facility] LISTED ON PROFILE. PT WANTS CALL  WHEN REFILL SENT TO PHARM.  PT CAN BE REACHED [phone]

## 2023-10-11 RX ORDER — ROSUVASTATIN CALCIUM 20 MG/1
20 TABLET, COATED ORAL NIGHTLY
Qty: 90 TABLET | Refills: 1 | Status: SHIPPED | OUTPATIENT
Start: 2023-10-11

## 2023-10-11 NOTE — TELEPHONE ENCOUNTER
Refill approved. Please call patient to let them know. Please remind provider to order a lipid panel at next visit.

## 2023-10-11 NOTE — TELEPHONE ENCOUNTER
Requested Prescriptions     Pending Prescriptions Disp Refills    rosuvastatin (CRESTOR) 20 MG tablet 90 tablet 1     Sig: Take 1 tablet by mouth at bedtime       Last Clinic Visit:  9/5/2023     Next Clinic Appointment:  12/20/2023

## 2023-11-20 RX ORDER — HYDROCHLOROTHIAZIDE 12.5 MG/1
12.5 CAPSULE, GELATIN COATED ORAL EVERY MORNING
Qty: 90 CAPSULE | Refills: 1 | Status: SHIPPED | OUTPATIENT
Start: 2023-11-20 | End: 2023-11-30 | Stop reason: SDUPTHER

## 2023-11-20 NOTE — TELEPHONE ENCOUNTER
Requested Prescriptions     Pending Prescriptions Disp Refills    hydroCHLOROthiazide (MICROZIDE) 12.5 MG capsule 90 capsule 1     Sig: Take 1 capsule by mouth every morning       Last Clinic Visit:  9/5/2023     Next Clinic Appointment:  12/20/2023

## 2023-11-27 ENCOUNTER — TELEPHONE (OUTPATIENT)
Dept: INTERNAL MEDICINE CLINIC | Age: 73
End: 2023-11-27

## 2023-11-27 NOTE — TELEPHONE ENCOUNTER
Spoke to this patient and a representative from [facility] and they both stated he should have enough to get him to around Dec. 21st.

## 2023-11-29 DIAGNOSIS — I10 ESSENTIAL HYPERTENSION: ICD-10-CM

## 2023-11-29 NOTE — TELEPHONE ENCOUNTER
PT STATED HIS HYDROCHLOROTHIAZIDE NEED TO BE SENT TO [facility]ISTED ON PROFILE. NOT [facility]. ALSO PT NEED A REFILL ON NIFEDIPINE SENT TO [facility]. PT WANTS [facility] REMOVED FROM PROFILE. PT CAN BE REACHED [phone]

## 2023-11-30 RX ORDER — HYDROCHLOROTHIAZIDE 12.5 MG/1
12.5 CAPSULE, GELATIN COATED ORAL EVERY MORNING
Qty: 90 CAPSULE | Refills: 1 | Status: SHIPPED | OUTPATIENT
Start: 2023-11-30 | End: 2023-12-20 | Stop reason: SDUPTHER

## 2023-11-30 RX ORDER — NIFEDIPINE 30 MG/1
30 TABLET, EXTENDED RELEASE ORAL DAILY
Qty: 90 TABLET | Refills: 1 | Status: SHIPPED | OUTPATIENT
Start: 2023-11-30 | End: 2023-12-20 | Stop reason: SDUPTHER

## 2023-12-20 PROBLEM — Z23 NEEDS FLU SHOT: Status: ACTIVE | Noted: 2023-12-20

## 2023-12-20 PROBLEM — N52.9 ERECTILE DYSFUNCTION: Status: ACTIVE | Noted: 2023-12-20

## 2024-02-28 ENCOUNTER — OFFICE VISIT (OUTPATIENT)
Dept: INTERNAL MEDICINE CLINIC | Age: 74
End: 2024-02-28
Payer: MEDICARE

## 2024-02-28 VITALS
RESPIRATION RATE: 16 BRPM | SYSTOLIC BLOOD PRESSURE: 125 MMHG | BODY MASS INDEX: 21.39 KG/M2 | TEMPERATURE: 97.3 F | HEART RATE: 74 BPM | HEIGHT: 64 IN | DIASTOLIC BLOOD PRESSURE: 86 MMHG | WEIGHT: 125.3 LBS | OXYGEN SATURATION: 100 %

## 2024-02-28 DIAGNOSIS — D70.9 NEUTROPENIA, UNSPECIFIED TYPE (HCC): ICD-10-CM

## 2024-02-28 DIAGNOSIS — I10 ESSENTIAL HYPERTENSION: ICD-10-CM

## 2024-02-28 DIAGNOSIS — E05.90 HYPERTHYROIDISM: Primary | ICD-10-CM

## 2024-02-28 DIAGNOSIS — Z00.00 HEALTHCARE MAINTENANCE: ICD-10-CM

## 2024-02-28 DIAGNOSIS — D53.9 ANEMIA, MACROCYTIC: ICD-10-CM

## 2024-02-28 DIAGNOSIS — Z00.00 HEALTH MAINTENANCE EXAMINATION: ICD-10-CM

## 2024-02-28 DIAGNOSIS — Z00.00 ENCOUNTER FOR ANNUAL WELLNESS VISIT (AWV) IN MEDICARE PATIENT: ICD-10-CM

## 2024-02-28 DIAGNOSIS — N18.4 CHRONIC KIDNEY DISEASE, STAGE IV (SEVERE) (HCC): ICD-10-CM

## 2024-02-28 DIAGNOSIS — R93.429 ABNORMAL ULTRASOUND OF KIDNEY: ICD-10-CM

## 2024-02-28 DIAGNOSIS — G95.20 CERVICAL CORD COMPRESSION WITH MYELOPATHY (HCC): ICD-10-CM

## 2024-02-28 DIAGNOSIS — Z12.5 ENCOUNTER FOR SCREENING FOR MALIGNANT NEOPLASM OF PROSTATE: ICD-10-CM

## 2024-02-28 DIAGNOSIS — Z00.00 ROUTINE ADULT HEALTH MAINTENANCE: ICD-10-CM

## 2024-02-28 DIAGNOSIS — G56.03 BILATERAL CARPAL TUNNEL SYNDROME: ICD-10-CM

## 2024-02-28 DIAGNOSIS — C61 PROSTATE CA (HCC): ICD-10-CM

## 2024-02-28 PROCEDURE — 99213 OFFICE O/P EST LOW 20 MIN: CPT

## 2024-02-28 ASSESSMENT — ENCOUNTER SYMPTOMS
TROUBLE SWALLOWING: 0
BACK PAIN: 0
CHEST TIGHTNESS: 0
SINUS PAIN: 0
CHOKING: 0
CONSTIPATION: 0
SHORTNESS OF BREATH: 0
ABDOMINAL DISTENTION: 0
BLOOD IN STOOL: 0
VOICE CHANGE: 0
ABDOMINAL PAIN: 0
VOMITING: 0
SORE THROAT: 0
EYE DISCHARGE: 0
EYE ITCHING: 0
RHINORRHEA: 0
DIARRHEA: 0
NAUSEA: 0
WHEEZING: 0
COUGH: 0
EYE PAIN: 0
SINUS PRESSURE: 0
COLOR CHANGE: 0
STRIDOR: 0
EYE REDNESS: 0
PHOTOPHOBIA: 0

## 2024-02-28 ASSESSMENT — PATIENT HEALTH QUESTIONNAIRE - PHQ9
SUM OF ALL RESPONSES TO PHQ QUESTIONS 1-9: 0
SUM OF ALL RESPONSES TO PHQ9 QUESTIONS 1 & 2: 0
2. FEELING DOWN, DEPRESSED OR HOPELESS: 0
1. LITTLE INTEREST OR PLEASURE IN DOING THINGS: 0
SUM OF ALL RESPONSES TO PHQ QUESTIONS 1-9: 0

## 2024-02-28 NOTE — PROGRESS NOTES
[facility]    Tobi Richards is a 73 y.o. male, here for evaluation of the following concerns:    PMH cervical cord compression w myelopathy, HTN, macrocytic anemia, CKD, prostate CA, hyperthyroidism, MGUS hx, carpal tunnel sx, who presents to the clinic for a f/u appointment.        Review of Systems   Constitutional:  Negative for activity change, appetite change, chills, diaphoresis, fatigue, fever and unexpected weight change.   HENT:  Negative for ear discharge, ear pain, rhinorrhea, sinus pressure, sinus pain, sneezing, sore throat, tinnitus, trouble swallowing and voice change.    Eyes:  Negative for photophobia, pain, discharge, redness, itching and visual disturbance.   Respiratory:  Negative for cough, choking, chest tightness, shortness of breath, wheezing and stridor.    Cardiovascular:  Negative for chest pain, palpitations and leg swelling.   Gastrointestinal:  Negative for abdominal distention, abdominal pain, blood in stool, constipation, diarrhea, nausea and vomiting.   Endocrine: Negative for polydipsia, polyphagia and polyuria.   Genitourinary:  Negative for decreased urine volume, difficulty urinating, dysuria, flank pain, frequency and hematuria.   Musculoskeletal:  Negative for arthralgias, back pain, gait problem, myalgias, neck pain and neck stiffness.   Skin:  Negative for color change, pallor, rash and wound.   Neurological:  Negative for dizziness, tremors, seizures, syncope, facial asymmetry, speech difficulty, weakness, light-headedness, numbness and headaches.   Psychiatric/Behavioral:  Negative for agitation, behavioral problems, confusion, decreased concentration, dysphoric mood, self-injury, sleep disturbance and suicidal ideas. The patient is not nervous/anxious.        MEDICATIONS:  Prior to Visit Medications    Medication Sig Taking? Authorizing Provider   Blood Pressure KIT 1 kit by Does not apply route daily WRIST cuff Yes Maximus,

## 2024-02-28 NOTE — PATIENT INSTRUCTIONS
Please continue to measure your blood pressure every morning, record it, and bring your journal to your next appointment.  Please go to the lab for your blood work.  Please go to the pharmacy for your vaccinations.  Please return to the clinic in June for your follow up appointment.  Please call the clinic in case of any problems with using or obtaining your medications.  Please call 911 or go to the emergency department in case of an emergency.

## 2024-06-12 ENCOUNTER — OFFICE VISIT (OUTPATIENT)
Dept: INTERNAL MEDICINE CLINIC | Age: 74
End: 2024-06-12
Payer: COMMERCIAL

## 2024-06-12 VITALS
TEMPERATURE: 97.2 F | BODY MASS INDEX: 21.87 KG/M2 | SYSTOLIC BLOOD PRESSURE: 149 MMHG | DIASTOLIC BLOOD PRESSURE: 71 MMHG | WEIGHT: 128.1 LBS | RESPIRATION RATE: 20 BRPM | OXYGEN SATURATION: 99 % | HEIGHT: 64 IN | HEART RATE: 71 BPM

## 2024-06-12 DIAGNOSIS — I10 ESSENTIAL HYPERTENSION: ICD-10-CM

## 2024-06-12 DIAGNOSIS — G56.03 BILATERAL CARPAL TUNNEL SYNDROME: ICD-10-CM

## 2024-06-12 DIAGNOSIS — N52.9 ERECTILE DYSFUNCTION, UNSPECIFIED ERECTILE DYSFUNCTION TYPE: ICD-10-CM

## 2024-06-12 DIAGNOSIS — H25.9 AGE-RELATED CATARACT OF BOTH EYES, UNSPECIFIED AGE-RELATED CATARACT TYPE: Chronic | ICD-10-CM

## 2024-06-12 DIAGNOSIS — Z00.00 ROUTINE ADULT HEALTH MAINTENANCE: ICD-10-CM

## 2024-06-12 DIAGNOSIS — Z12.5 ENCOUNTER FOR SCREENING FOR MALIGNANT NEOPLASM OF PROSTATE: ICD-10-CM

## 2024-06-12 DIAGNOSIS — D70.9 NEUTROPENIA, UNSPECIFIED TYPE (HCC): ICD-10-CM

## 2024-06-12 DIAGNOSIS — G95.20 CERVICAL CORD COMPRESSION WITH MYELOPATHY (HCC): ICD-10-CM

## 2024-06-12 DIAGNOSIS — R63.4 WEIGHT LOSS: ICD-10-CM

## 2024-06-12 DIAGNOSIS — E05.90 HYPERTHYROIDISM: ICD-10-CM

## 2024-06-12 DIAGNOSIS — D53.9 ANEMIA, MACROCYTIC: ICD-10-CM

## 2024-06-12 DIAGNOSIS — I10 ESSENTIAL HYPERTENSION: Primary | ICD-10-CM

## 2024-06-12 DIAGNOSIS — N18.4 CHRONIC KIDNEY DISEASE, STAGE IV (SEVERE) (HCC): ICD-10-CM

## 2024-06-12 DIAGNOSIS — C61 PROSTATE CA (HCC): ICD-10-CM

## 2024-06-12 PROCEDURE — 99213 OFFICE O/P EST LOW 20 MIN: CPT

## 2024-06-12 NOTE — PATIENT INSTRUCTIONS
Please continue to take your medication as discussed.   Please complete your blood tests prior to next appointment.   Please make an appointment and follow up with nephrology.   Please return to the clinic for your follow up appointment in two months.  Please call 911 or go to the emergency department in case of an emergency or acute symptoms including but not limited to chest pain, shortness of breath, lightheadedness/dizziness, new numbness/weakness.

## 2024-06-12 NOTE — PROGRESS NOTES
Site: Left Upper Arm Right Upper Arm   Position: Sitting Sitting   Cuff Size: Small Adult Small Adult   Pulse: 79 71   Resp: 20    Temp: 97.2 °F (36.2 °C)    TempSrc: Temporal    SpO2: 99%    Weight: 58.1 kg (128 lb 1.6 oz)    Height: 1.626 m (5' 4\")       Estimated body mass index is 21.99 kg/m² as calculated from the following:    Height as of this encounter: 1.626 m (5' 4\").    Weight as of this encounter: 58.1 kg (128 lb 1.6 oz).  Physical Exam  Vitals reviewed.   Constitutional:       General: He is not in acute distress.     Appearance: Normal appearance. He is normal weight. He is not ill-appearing, toxic-appearing or diaphoretic.   HENT:      Head: Normocephalic and atraumatic.      Mouth/Throat:      Mouth: Mucous membranes are moist.   Eyes:      General:         Right eye: No discharge.         Left eye: No discharge.      Extraocular Movements: Extraocular movements intact.      Pupils: Pupils are equal, round, and reactive to light.      Comments: BL Arcus senilis    Cardiovascular:      Rate and Rhythm: Normal rate.   Pulmonary:      Effort: Pulmonary effort is normal. No respiratory distress.      Breath sounds: Normal breath sounds. No stridor. No wheezing, rhonchi or rales.   Chest:      Chest wall: No tenderness.   Abdominal:      General: Abdomen is flat.      Palpations: Abdomen is soft.      Tenderness: There is no abdominal tenderness. There is no right CVA tenderness, left CVA tenderness or guarding.   Musculoskeletal:         General: No deformity or signs of injury.      Right lower leg: No edema.      Left lower leg: No edema.   Skin:     General: Skin is warm and dry.      Capillary Refill: Capillary refill takes less than 2 seconds.      Coloration: Skin is not jaundiced or pale.   Neurological:      General: No focal deficit present.      Mental Status: He is alert and oriented to person, place, and time.      Cranial Nerves: No cranial nerve deficit.      Sensory: No sensory deficit.

## 2024-06-13 LAB
ALBUMIN SERPL-MCNC: 4.4 G/DL (ref 3.4–5)
ANION GAP SERPL CALCULATED.3IONS-SCNC: 12 MMOL/L (ref 3–16)
BASOPHILS # BLD: 0.1 K/UL (ref 0–0.2)
BASOPHILS NFR BLD: 1 %
BUN SERPL-MCNC: 38 MG/DL (ref 7–20)
CALCIUM SERPL-MCNC: 9.7 MG/DL (ref 8.3–10.6)
CHLORIDE SERPL-SCNC: 100 MMOL/L (ref 99–110)
CHOLEST SERPL-MCNC: 286 MG/DL (ref 0–199)
CO2 SERPL-SCNC: 28 MMOL/L (ref 21–32)
CREAT SERPL-MCNC: 1.6 MG/DL (ref 0.8–1.3)
DEPRECATED RDW RBC AUTO: 14.1 % (ref 12.4–15.4)
EOSINOPHIL # BLD: 0 K/UL (ref 0–0.6)
EOSINOPHIL NFR BLD: 0 %
EST. AVERAGE GLUCOSE BLD GHB EST-MCNC: 116.9 MG/DL
FOLATE SERPL-MCNC: 7.18 NG/ML (ref 4.78–24.2)
GFR SERPLBLD CREATININE-BSD FMLA CKD-EPI: 45 ML/MIN/{1.73_M2}
GLUCOSE SERPL-MCNC: 94 MG/DL (ref 70–99)
HBA1C MFR BLD: 5.7 %
HCT VFR BLD AUTO: 42.9 % (ref 40.5–52.5)
HDLC SERPL-MCNC: 75 MG/DL (ref 40–60)
HGB BLD-MCNC: 14.5 G/DL (ref 13.5–17.5)
LDLC SERPL CALC-MCNC: 186 MG/DL
LYMPHOCYTES # BLD: 1.8 K/UL (ref 1–5.1)
LYMPHOCYTES NFR BLD: 24 %
MAGNESIUM SERPL-MCNC: 1.9 MG/DL (ref 1.8–2.4)
MCH RBC QN AUTO: 32.8 PG (ref 26–34)
MCHC RBC AUTO-ENTMCNC: 33.7 G/DL (ref 31–36)
MCV RBC AUTO: 97.2 FL (ref 80–100)
MONOCYTES # BLD: 0.4 K/UL (ref 0–1.3)
MONOCYTES NFR BLD: 6 %
NEUTROPHILS # BLD: 4.8 K/UL (ref 1.7–7.7)
NEUTROPHILS NFR BLD: 66 %
NEUTS BAND NFR BLD MANUAL: 2 % (ref 0–7)
PHOSPHATE SERPL-MCNC: 4.3 MG/DL (ref 2.5–4.9)
PLATELET # BLD AUTO: 243 K/UL (ref 135–450)
PMV BLD AUTO: 8.3 FL (ref 5–10.5)
POTASSIUM SERPL-SCNC: 5 MMOL/L (ref 3.5–5.1)
PSA SERPL DL<=0.01 NG/ML-MCNC: 0.12 NG/ML (ref 0–4)
RBC # BLD AUTO: 4.41 M/UL (ref 4.2–5.9)
RBC MORPH BLD: NORMAL
SLIDE REVIEW: NORMAL
SODIUM SERPL-SCNC: 140 MMOL/L (ref 136–145)
TRIGL SERPL-MCNC: 127 MG/DL (ref 0–150)
TSH SERPL DL<=0.005 MIU/L-ACNC: 2.53 UIU/ML (ref 0.27–4.2)
VARIANT LYMPHS NFR BLD MANUAL: 1 % (ref 0–6)
VIT B12 SERPL-MCNC: 1584 PG/ML (ref 211–911)
VLDLC SERPL CALC-MCNC: 25 MG/DL
WBC # BLD AUTO: 7.1 K/UL (ref 4–11)

## 2024-06-17 RX ORDER — ROSUVASTATIN CALCIUM 20 MG/1
20 TABLET, COATED ORAL NIGHTLY
Qty: 90 TABLET | Refills: 1 | Status: SHIPPED | OUTPATIENT
Start: 2024-06-17

## 2024-06-17 NOTE — TELEPHONE ENCOUNTER
Requested Prescriptions     Pending Prescriptions Disp Refills    rosuvastatin (CRESTOR) 20 MG tablet [Pharmacy Med Name: Rosuvastatin Calcium 20mg Tablet] 90 tablet 1     Sig: Take 1 tablet by mouth at bedtime.       Last Clinic Visit:  6/12/2024     Next Clinic Appointment:  8/13/2024

## 2024-08-22 ENCOUNTER — OFFICE VISIT (OUTPATIENT)
Dept: INTERNAL MEDICINE CLINIC | Age: 74
End: 2024-08-22
Payer: COMMERCIAL

## 2024-08-22 VITALS
SYSTOLIC BLOOD PRESSURE: 142 MMHG | WEIGHT: 128 LBS | HEART RATE: 70 BPM | TEMPERATURE: 97 F | DIASTOLIC BLOOD PRESSURE: 93 MMHG | OXYGEN SATURATION: 94 % | RESPIRATION RATE: 16 BRPM | BODY MASS INDEX: 21.97 KG/M2

## 2024-08-22 DIAGNOSIS — I10 ESSENTIAL HYPERTENSION: ICD-10-CM

## 2024-08-22 DIAGNOSIS — R93.429 ABNORMAL ULTRASOUND OF KIDNEY: Primary | ICD-10-CM

## 2024-08-22 DIAGNOSIS — N18.4 CHRONIC KIDNEY DISEASE, STAGE IV (SEVERE) (HCC): ICD-10-CM

## 2024-08-22 DIAGNOSIS — R63.4 WEIGHT LOSS: ICD-10-CM

## 2024-08-22 PROCEDURE — 99213 OFFICE O/P EST LOW 20 MIN: CPT

## 2024-08-22 RX ORDER — NIFEDIPINE 30 MG/1
30 TABLET, EXTENDED RELEASE ORAL DAILY
Qty: 90 TABLET | Refills: 1 | Status: SHIPPED | OUTPATIENT
Start: 2024-08-22 | End: 2025-02-18

## 2024-08-22 RX ORDER — HYDROCHLOROTHIAZIDE 12.5 MG/1
12.5 CAPSULE, GELATIN COATED ORAL EVERY MORNING
Qty: 90 CAPSULE | Refills: 1 | Status: SHIPPED | OUTPATIENT
Start: 2024-08-22

## 2024-08-22 NOTE — PROGRESS NOTES
tablet Take 1 tablet by mouth daily  Enayataval, Behnam, MD   folic acid (FOLVITE) 1 MG tablet Take 1 tablet by mouth daily  Enayataval, Behnam, MD   vitamin D (CHOLECALCIFEROL) 50 MCG (2000 UT) CAPS capsule Take 1 capsule by mouth daily  Patient not taking: Reported on 2/28/2024  Enayataval, Behnam, MD   Lift Chair MISC by Does not apply route  Roopa Allen MD        Vitals:    08/22/24 1403 08/22/24 1404 08/22/24 1405   BP: (!) 146/95 (!) 145/93 (!) 142/93   Site: Left Upper Arm     Position: Sitting     Cuff Size: Medium Adult     Pulse: 70     Resp: 16     Temp: 97 °F (36.1 °C)     TempSrc: Temporal     SpO2: 94%     Weight: 58.1 kg (128 lb)        Estimated body mass index is 21.97 kg/m² as calculated from the following:    Height as of 6/12/24: 1.626 m (5' 4\").    Weight as of this encounter: 58.1 kg (128 lb).  Physical Exam  Constitutional:       General: He is not in acute distress.  HENT:      Head: Normocephalic and atraumatic.      Mouth/Throat:      Pharynx: Oropharynx is clear.   Eyes:      Extraocular Movements: Extraocular movements intact.   Cardiovascular:      Rate and Rhythm: Normal rate and regular rhythm.   Pulmonary:      Effort: Pulmonary effort is normal. No respiratory distress.      Breath sounds: Normal breath sounds.   Abdominal:      General: Bowel sounds are normal.      Palpations: Abdomen is soft.      Tenderness: There is no abdominal tenderness.   Musculoskeletal:      Right lower leg: No edema.      Left lower leg: No edema.   Skin:     General: Skin is warm and dry.   Neurological:      Mental Status: He is alert and oriented to person, place, and time.   Psychiatric:         Mood and Affect: Mood normal.         Behavior: Behavior normal.       ASSESSMENT/PLAN:     Essential hypertension  Patient's BP in office is elevated to 146/95 and then 142/93 on repeat.  Patient reports his blood pressure readings at home are usually in the 120s systolic and do not get elevated like

## 2024-08-22 NOTE — PATIENT INSTRUCTIONS
When you go to the urology appointment, as them if they can send over their office note to our office so we can see the records.   Next office visit, please bring in a log of your blood pressure readings and bring in the actual blood pressure cuff so we can make sure it is reading accurately.

## 2024-10-15 ENCOUNTER — HOSPITAL ENCOUNTER (OUTPATIENT)
Dept: MRI IMAGING | Age: 74
Discharge: HOME | End: 2024-10-15
Attending: UROLOGY
Payer: MEDICARE

## 2024-10-15 DIAGNOSIS — R35.1 NOCTURIA: ICD-10-CM

## 2024-10-15 DIAGNOSIS — C61 MALIGNANT NEOPLASM OF PROSTATE (HCC): ICD-10-CM

## 2024-10-15 DIAGNOSIS — R35.0 FREQUENCY OF MICTURITION: ICD-10-CM

## 2024-10-15 DIAGNOSIS — N52.8 OTHER MALE ERECTILE DYSFUNCTION: ICD-10-CM

## 2024-10-15 DIAGNOSIS — R39.198 OTHER DIFFICULTIES WITH MICTURITION: ICD-10-CM

## 2024-10-15 PROCEDURE — 6360000004 HC RX CONTRAST MEDICATION: Performed by: UROLOGY

## 2024-10-15 PROCEDURE — 74183 MRI ABD W/O CNTR FLWD CNTR: CPT

## 2024-10-15 PROCEDURE — A9576 INJ PROHANCE MULTIPACK: HCPCS | Performed by: UROLOGY

## 2024-10-15 RX ADMIN — GADOTERIDOL 13 ML: 279.3 INJECTION, SOLUTION INTRAVENOUS at 07:49

## 2024-10-29 ENCOUNTER — OFFICE VISIT (OUTPATIENT)
Dept: INTERNAL MEDICINE CLINIC | Age: 74
End: 2024-10-29
Payer: MEDICARE

## 2024-10-29 VITALS
TEMPERATURE: 98.3 F | HEIGHT: 64 IN | HEART RATE: 72 BPM | SYSTOLIC BLOOD PRESSURE: 118 MMHG | RESPIRATION RATE: 16 BRPM | BODY MASS INDEX: 22.23 KG/M2 | WEIGHT: 130.2 LBS | DIASTOLIC BLOOD PRESSURE: 83 MMHG | OXYGEN SATURATION: 98 %

## 2024-10-29 DIAGNOSIS — I10 ESSENTIAL HYPERTENSION: ICD-10-CM

## 2024-10-29 DIAGNOSIS — Z91.81 FALLS INFREQUENTLY: ICD-10-CM

## 2024-10-29 DIAGNOSIS — E78.00 HYPERCHOLESTEREMIA: ICD-10-CM

## 2024-10-29 DIAGNOSIS — N18.4 CHRONIC KIDNEY DISEASE, STAGE IV (SEVERE) (HCC): Primary | ICD-10-CM

## 2024-10-29 PROCEDURE — 99213 OFFICE O/P EST LOW 20 MIN: CPT

## 2024-10-29 NOTE — PROGRESS NOTES
[facility]    Tobi Richards is a 74 y.o. male, here for evaluation of the following concerns described below:    HPI  74 y.o. male here for follow up for hypertension management. His blood pressure today is 118/83. He takes his blood pressure at home and has had consistent readings in the 120/80 range. Patient reports being physically active and doing some stationary bike exercises in his basement.     He reported stopping taking his rosuvastatin earlier in the year. He reports no specific side effects, just said he felt like he didn't really need it.     Patient followed up with nephrology and urology about his renal cysts. He saw Dr Soriano with urology who recommended an MRI.  MRI showed decrease cyst size compared to previous study and recommendation is for further imagining in 6 - 12 months but Dr Soriano said no intervention needed at this time.    Review of Systems - A 10 point review of systems was conducted and significant findings noted in HPI.    MEDICATIONS:  Prior to Visit Medications    Medication Sig Taking? Authorizing Provider   hydroCHLOROthiazide 12.5 MG capsule Take 1 capsule by mouth every morning Yes Isabel Alexandre MD   NIFEdipine (PROCARDIA XL) 30 MG extended release tablet Take 1 tablet by mouth daily Yes Isabel Alexandre MD   rosuvastatin (CRESTOR) 20 MG tablet Take 1 tablet by mouth at bedtime. Yes Shiva Schafer MD   Blood Pressure KIT 1 kit by Does not apply route daily WRIST cuff Yes Enayataval, Behnam, MD   folic acid (FOLVITE) 1 MG tablet Take 1 tablet by mouth daily Yes Enayataval, Behnam, MD   sildenafil (VIAGRA) 100 MG tablet Take 1 tablet by mouth daily as needed for Erectile Dysfunction Yes CE Jacome MD   cyanocobalamin ([facility] VITAMIN B12) 1000 MCG tablet Take by mouth daily Yes ProviderJosie MD   vitamin B-12 (CYANOCOBALAMIN) 1000 MCG tablet Take 1 tablet by mouth daily Yes Vicenta Kelsey DO

## 2025-02-14 ENCOUNTER — OFFICE VISIT (OUTPATIENT)
Dept: INTERNAL MEDICINE CLINIC | Age: 75
End: 2025-02-14
Payer: MEDICARE

## 2025-02-14 VITALS
HEART RATE: 64 BPM | BODY MASS INDEX: 21.7 KG/M2 | TEMPERATURE: 98.4 F | RESPIRATION RATE: 18 BRPM | DIASTOLIC BLOOD PRESSURE: 97 MMHG | OXYGEN SATURATION: 99 % | WEIGHT: 127.1 LBS | HEIGHT: 64 IN | SYSTOLIC BLOOD PRESSURE: 151 MMHG

## 2025-02-14 DIAGNOSIS — E78.00 HYPERCHOLESTEREMIA: ICD-10-CM

## 2025-02-14 DIAGNOSIS — Z12.11 SCREENING FOR COLON CANCER: ICD-10-CM

## 2025-02-14 DIAGNOSIS — I10 ESSENTIAL HYPERTENSION: ICD-10-CM

## 2025-02-14 DIAGNOSIS — Z23 NEEDS FLU SHOT: Primary | ICD-10-CM

## 2025-02-14 DIAGNOSIS — N18.4 CHRONIC KIDNEY DISEASE, STAGE IV (SEVERE) (HCC): ICD-10-CM

## 2025-02-14 DIAGNOSIS — Z00.00 HEALTHCARE MAINTENANCE: ICD-10-CM

## 2025-02-14 PROCEDURE — G0008 ADMIN INFLUENZA VIRUS VAC: HCPCS

## 2025-02-14 PROCEDURE — 99213 OFFICE O/P EST LOW 20 MIN: CPT

## 2025-02-14 SDOH — ECONOMIC STABILITY: FOOD INSECURITY: WITHIN THE PAST 12 MONTHS, YOU WORRIED THAT YOUR FOOD WOULD RUN OUT BEFORE YOU GOT MONEY TO BUY MORE.: NEVER TRUE

## 2025-02-14 SDOH — ECONOMIC STABILITY: FOOD INSECURITY: WITHIN THE PAST 12 MONTHS, THE FOOD YOU BOUGHT JUST DIDN'T LAST AND YOU DIDN'T HAVE MONEY TO GET MORE.: NEVER TRUE

## 2025-02-14 ASSESSMENT — PATIENT HEALTH QUESTIONNAIRE - PHQ9
SUM OF ALL RESPONSES TO PHQ QUESTIONS 1-9: 0
SUM OF ALL RESPONSES TO PHQ QUESTIONS 1-9: 0
SUM OF ALL RESPONSES TO PHQ9 QUESTIONS 1 & 2: 0
1. LITTLE INTEREST OR PLEASURE IN DOING THINGS: NOT AT ALL
2. FEELING DOWN, DEPRESSED OR HOPELESS: NOT AT ALL
SUM OF ALL RESPONSES TO PHQ QUESTIONS 1-9: 0
SUM OF ALL RESPONSES TO PHQ QUESTIONS 1-9: 0

## 2025-02-14 NOTE — PROGRESS NOTES
[facility]    Tobi Richards is a 74 y.o. male, here for evaluation of the following concerns described below:    HPI  Patient is here for general checkup today.  He has no complaints today and feels well overall.      Patient reports taking BP readings at home and reports that his SBP is usually around 130.  Last office visit, patient had come off his rosuvastatin for no particular reason and I asked her to restart it.  Patient reports he has been taking the rosuvastatin since last office visit.    Patient continues to follow closely with his nephrologist Dr. Quinones and has an appointment coming up later this month.    Review of Systems - A 10 point review of systems was conducted and significant findings noted in HPI.    MEDICATIONS:  Prior to Visit Medications    Medication Sig Taking? Authorizing Provider   Misc. Devices (RAISED TOILET SEAT) MISC Dispense one to be used as directed Yes Isabel Alexandre MD   hydroCHLOROthiazide 12.5 MG capsule Take 1 capsule by mouth every morning Yes Isabel Alexandre MD   NIFEdipine (PROCARDIA XL) 30 MG extended release tablet Take 1 tablet by mouth daily Yes Isabel Alexandre MD   rosuvastatin (CRESTOR) 20 MG tablet Take 1 tablet by mouth at bedtime. Yes Shiva Schafer MD   Blood Pressure KIT 1 kit by Does not apply route daily WRIST cuff Yes Enayataval, Behnam, MD   sildenafil (VIAGRA) 100 MG tablet Take 1 tablet by mouth daily as needed for Erectile Dysfunction Yes CE Jacome MD   cyanocobalamin ([facility] VITAMIN B12) 1000 MCG tablet Take by mouth daily Yes Josie Cruz MD   vitamin B-12 (CYANOCOBALAMIN) 1000 MCG tablet Take 1 tablet by mouth daily Yes Vicenta Kelsey DO   acetaminophen (TYLENOL) 325 MG tablet Take 2 tablets by mouth every 6 hours as needed for Pain Yes Josie Cruz MD   Lift Chair MISC by Does not apply route Yes Roopa Allen MD   vitamin C (ASCORBIC ACID) 500 MG

## 2025-02-14 NOTE — PATIENT INSTRUCTIONS
Continue taking your blood pressure at home every so often.  Make sure before you take it, that you are calm and have been sitting quietly for a few minutes.  No changes were made to your medication today.  I have given you a referral for a GI doctor who can do a screening colonoscopy for you.  Screening colonoscopies can help detect any signs of early colon cancer.  Call this doctor's office and book this colonoscopy at any time over the next couple months that is convenient for you.  I have given you some blood work to do a few days before your next appointment here.

## 2025-05-28 ENCOUNTER — OFFICE VISIT (OUTPATIENT)
Dept: INTERNAL MEDICINE CLINIC | Age: 75
End: 2025-05-28
Payer: MEDICARE

## 2025-05-28 VITALS
HEART RATE: 74 BPM | TEMPERATURE: 97.9 F | BODY MASS INDEX: 21.51 KG/M2 | DIASTOLIC BLOOD PRESSURE: 86 MMHG | HEIGHT: 64 IN | WEIGHT: 126 LBS | RESPIRATION RATE: 16 BRPM | SYSTOLIC BLOOD PRESSURE: 136 MMHG | OXYGEN SATURATION: 100 %

## 2025-05-28 DIAGNOSIS — Z91.81 FALLS INFREQUENTLY: ICD-10-CM

## 2025-05-28 DIAGNOSIS — E78.00 HYPERCHOLESTEREMIA: ICD-10-CM

## 2025-05-28 DIAGNOSIS — R93.429 ABNORMAL ULTRASOUND OF KIDNEY: ICD-10-CM

## 2025-05-28 DIAGNOSIS — I10 ESSENTIAL HYPERTENSION: Primary | ICD-10-CM

## 2025-05-28 PROBLEM — C61 PROSTATE CANCER (HCC): Status: RESOLVED | Noted: 2022-02-15 | Resolved: 2025-05-28

## 2025-05-28 PROCEDURE — 99213 OFFICE O/P EST LOW 20 MIN: CPT

## 2025-05-28 NOTE — PATIENT INSTRUCTIONS
Get your labs done fasting anytime before your next appointment.  I'm not sure if insurance will cover the lift chair, but we will try.

## 2025-05-28 NOTE — PROGRESS NOTES
still elevated.  Patient will have to get labs done again in next appointment I can adjust his Crestor if needed based on the labs.  For now, continue Crestor 20 mg nightly.  -     LIPID PANEL; Future    Abnormal ultrasound of kidney  Patient had a kidney ultrasound 4/5/2022 which showed 2 hyperechoic lesions.  Recommendation was for further evaluation with an MRI.  MRI 8/21/2023 showed a stable 1.2 cm lesion in the interpolar right kidney that was too small to fully characterize but radiologist described this as a solid neoplasm or complex cyst.  There was a new 2.1 x 1.6 cm partially exophytic complex cyst arising from the upper pole of the left kidney.  Recommendation was for follow-up MRI in 6 months.  Ultrasound 5/8/24 showed the right lesion was mildly increase in size from the 2022 ultrasound and was still viewed as a solid lesion vs complex cystic lesion.  The left lesion was also mildly increased in size.  MRI done 10/16/24 showed the right lesion appeared solid and RCC could not be excluded.  It's size was 12 x 9mm.  The left kidney showed a resolving hemorrhagic cyst.  Recommendation was for f/u scan in 6 - 12 months.  Patient follows with urology.  He said he saw them within the last 2 months and that they did an ultrasound of his kidneys.  He reports based on that ultrasound, no further follow up was needed and that he can go back and see them in 1 year.  Will ask urology for office visit notes and results of the ultrasound as they are not visible to me in the EMR.  I will confirm their recommendations.  If they did not clear the patient, he will need a follow up MRI between April 2025 - Nov 2025 to track progression of the kidney lesions.    Return in about 3 months (around 8/28/2025) for Routine health follow-up.   To do's next visit:  1) Check and see if urology notes were received and if patient needs a repeat MRI of kidneys or not.  2) If lipid levels are still elevated, increase Crestor

## 2025-06-11 DIAGNOSIS — E78.00 HYPERCHOLESTEREMIA: ICD-10-CM

## 2025-06-11 DIAGNOSIS — N18.4 CHRONIC KIDNEY DISEASE, STAGE IV (SEVERE) (HCC): ICD-10-CM

## 2025-06-11 DIAGNOSIS — I10 ESSENTIAL HYPERTENSION: ICD-10-CM

## 2025-06-11 LAB
ALBUMIN SERPL-MCNC: 4.5 G/DL (ref 3.4–5)
ALBUMIN/GLOB SERPL: 1.5 {RATIO} (ref 1.1–2.2)
ALP SERPL-CCNC: 66 U/L (ref 40–129)
ALT SERPL-CCNC: 9 U/L (ref 10–40)
ANION GAP SERPL CALCULATED.3IONS-SCNC: 14 MMOL/L (ref 3–16)
AST SERPL-CCNC: 12 U/L (ref 15–37)
BILIRUB SERPL-MCNC: 0.7 MG/DL (ref 0–1)
BUN SERPL-MCNC: 40 MG/DL (ref 7–20)
CALCIUM SERPL-MCNC: 10.1 MG/DL (ref 8.3–10.6)
CHLORIDE SERPL-SCNC: 100 MMOL/L (ref 99–110)
CHOLEST SERPL-MCNC: 312 MG/DL (ref 0–199)
CO2 SERPL-SCNC: 24 MMOL/L (ref 21–32)
CREAT SERPL-MCNC: 1.7 MG/DL (ref 0.8–1.3)
GFR SERPLBLD CREATININE-BSD FMLA CKD-EPI: 42 ML/MIN/{1.73_M2}
GLUCOSE SERPL-MCNC: 81 MG/DL (ref 70–99)
HDLC SERPL-MCNC: 62 MG/DL (ref 40–60)
LDLC SERPL CALC-MCNC: 233 MG/DL
PHOSPHATE SERPL-MCNC: 3.1 MG/DL (ref 2.5–4.9)
POTASSIUM SERPL-SCNC: 4.9 MMOL/L (ref 3.5–5.1)
PROT SERPL-MCNC: 7.5 G/DL (ref 6.4–8.2)
SODIUM SERPL-SCNC: 138 MMOL/L (ref 136–145)
TRIGL SERPL-MCNC: 84 MG/DL (ref 0–150)
VLDLC SERPL CALC-MCNC: 17 MG/DL

## 2025-08-04 ENCOUNTER — OFFICE VISIT (OUTPATIENT)
Dept: INTERNAL MEDICINE CLINIC | Age: 75
End: 2025-08-04
Payer: MEDICARE

## 2025-08-04 VITALS
TEMPERATURE: 97.7 F | DIASTOLIC BLOOD PRESSURE: 85 MMHG | SYSTOLIC BLOOD PRESSURE: 133 MMHG | WEIGHT: 122.4 LBS | OXYGEN SATURATION: 99 % | BODY MASS INDEX: 20.9 KG/M2 | HEART RATE: 60 BPM | HEIGHT: 64 IN | RESPIRATION RATE: 16 BRPM

## 2025-08-04 DIAGNOSIS — I10 ESSENTIAL HYPERTENSION: Primary | ICD-10-CM

## 2025-08-04 PROCEDURE — 99213 OFFICE O/P EST LOW 20 MIN: CPT

## 2025-08-04 RX ORDER — ROSUVASTATIN CALCIUM 40 MG/1
40 TABLET, COATED ORAL DAILY
Qty: 90 TABLET | Refills: 1 | Status: SHIPPED | OUTPATIENT
Start: 2025-08-04

## 2025-08-04 ASSESSMENT — ENCOUNTER SYMPTOMS
SHORTNESS OF BREATH: 0
ABDOMINAL DISTENTION: 0
DIARRHEA: 0
NAUSEA: 0
ABDOMINAL PAIN: 0
COUGH: 0
CONSTIPATION: 0
VOMITING: 0
APNEA: 0
CHEST TIGHTNESS: 0

## 2025-08-26 DIAGNOSIS — Z00.00 WELLNESS EXAMINATION: ICD-10-CM

## 2025-08-26 DIAGNOSIS — Z00.00 WELLNESS EXAMINATION: Primary | ICD-10-CM

## 2025-08-27 LAB
ALBUMIN SERPL-MCNC: 4.4 G/DL (ref 3.4–5)
ALBUMIN/GLOB SERPL: 1.5 {RATIO} (ref 1.1–2.2)
ALP SERPL-CCNC: 63 U/L (ref 40–129)
ALT SERPL-CCNC: 9 U/L (ref 10–40)
ANION GAP SERPL CALCULATED.3IONS-SCNC: 12 MMOL/L (ref 3–16)
AST SERPL-CCNC: 12 U/L (ref 15–37)
BILIRUB DIRECT SERPL-MCNC: 0.2 MG/DL (ref 0–0.3)
BILIRUB INDIRECT SERPL-MCNC: 0.4 MG/DL (ref 0–1)
BILIRUB SERPL-MCNC: 0.6 MG/DL (ref 0–1)
BUN SERPL-MCNC: 30 MG/DL (ref 7–20)
CALCIUM SERPL-MCNC: 9.9 MG/DL (ref 8.3–10.6)
CHLORIDE SERPL-SCNC: 102 MMOL/L (ref 99–110)
CO2 SERPL-SCNC: 28 MMOL/L (ref 21–32)
CREAT SERPL-MCNC: 1.4 MG/DL (ref 0.8–1.3)
GFR SERPLBLD CREATININE-BSD FMLA CKD-EPI: 53 ML/MIN/{1.73_M2}
GLUCOSE SERPL-MCNC: 116 MG/DL (ref 70–99)
POTASSIUM SERPL-SCNC: 4.5 MMOL/L (ref 3.5–5.1)
PROT SERPL-MCNC: 7.4 G/DL (ref 6.4–8.2)
SODIUM SERPL-SCNC: 142 MMOL/L (ref 136–145)